# Patient Record
Sex: FEMALE | Race: OTHER | Employment: UNEMPLOYED | URBAN - METROPOLITAN AREA
[De-identification: names, ages, dates, MRNs, and addresses within clinical notes are randomized per-mention and may not be internally consistent; named-entity substitution may affect disease eponyms.]

---

## 2017-10-13 ENCOUNTER — HOSPITAL ENCOUNTER (OUTPATIENT)
Dept: PREADMISSION TESTING | Age: 47
Discharge: HOME | End: 2017-10-13
Payer: COMMERCIAL

## 2017-10-13 ENCOUNTER — HOSPITAL ENCOUNTER (OUTPATIENT)
Dept: GENERAL RADIOLOGY | Age: 47
Discharge: HOME | End: 2017-10-13
Payer: COMMERCIAL

## 2017-10-13 VITALS
WEIGHT: 168 LBS | SYSTOLIC BLOOD PRESSURE: 127 MMHG | HEIGHT: 62 IN | BODY MASS INDEX: 30.91 KG/M2 | TEMPERATURE: 98.3 F | RESPIRATION RATE: 18 BRPM | DIASTOLIC BLOOD PRESSURE: 82 MMHG | HEART RATE: 76 BPM

## 2017-10-13 DIAGNOSIS — Z01.818 PREOP EXAMINATION: ICD-10-CM

## 2017-10-13 LAB
ALBUMIN SERPL-MCNC: 3.7 G/DL (ref 3.5–5)
ALBUMIN/GLOB SERPL: 1 {RATIO} (ref 1.1–2.2)
ALP SERPL-CCNC: 167 U/L (ref 45–117)
ALT SERPL-CCNC: 46 U/L (ref 12–78)
ANION GAP SERPL CALC-SCNC: 8 MMOL/L (ref 5–15)
AST SERPL-CCNC: 17 U/L (ref 15–37)
BASOPHILS # BLD: 0 K/UL (ref 0–0.1)
BASOPHILS NFR BLD: 0 % (ref 0–1)
BILIRUB SERPL-MCNC: 0.2 MG/DL (ref 0.2–1)
BUN SERPL-MCNC: 11 MG/DL (ref 6–20)
BUN/CREAT SERPL: 15 (ref 12–20)
CALCIUM SERPL-MCNC: 9.3 MG/DL (ref 8.5–10.1)
CHLORIDE SERPL-SCNC: 103 MMOL/L (ref 97–108)
CO2 SERPL-SCNC: 29 MMOL/L (ref 21–32)
CREAT SERPL-MCNC: 0.72 MG/DL (ref 0.55–1.02)
DIFFERENTIAL METHOD BLD: ABNORMAL
EOSINOPHIL # BLD: 0.2 K/UL (ref 0–0.4)
EOSINOPHIL NFR BLD: 2 % (ref 0–7)
ERYTHROCYTE [DISTWIDTH] IN BLOOD BY AUTOMATED COUNT: 22.4 % (ref 11.5–14.5)
GLOBULIN SER CALC-MCNC: 3.7 G/DL (ref 2–4)
GLUCOSE SERPL-MCNC: 92 MG/DL (ref 65–100)
HCT VFR BLD AUTO: 42.4 % (ref 35–47)
HGB BLD-MCNC: 13.7 G/DL (ref 11.5–16)
LYMPHOCYTES # BLD: 2.3 K/UL (ref 0.8–3.5)
LYMPHOCYTES NFR BLD: 25 % (ref 12–49)
MCH RBC QN AUTO: 25.1 PG (ref 26–34)
MCHC RBC AUTO-ENTMCNC: 32.3 G/DL (ref 30–36.5)
MCV RBC AUTO: 77.7 FL (ref 80–99)
MONOCYTES # BLD: 0.5 K/UL (ref 0–1)
MONOCYTES NFR BLD: 5 % (ref 5–13)
NEUTS SEG # BLD: 6.1 K/UL (ref 1.8–8)
NEUTS SEG NFR BLD: 68 % (ref 32–75)
PLATELET # BLD AUTO: 398 K/UL (ref 150–400)
POTASSIUM SERPL-SCNC: 3.9 MMOL/L (ref 3.5–5.1)
PROT SERPL-MCNC: 7.4 G/DL (ref 6.4–8.2)
RBC # BLD AUTO: 5.46 M/UL (ref 3.8–5.2)
RBC MORPH BLD: ABNORMAL
SODIUM SERPL-SCNC: 140 MMOL/L (ref 136–145)
WBC # BLD AUTO: 9.1 K/UL (ref 3.6–11)
WBC MORPH BLD: ABNORMAL

## 2017-10-13 PROCEDURE — 71020 XR CHEST PA LAT: CPT

## 2017-10-13 PROCEDURE — 80053 COMPREHEN METABOLIC PANEL: CPT | Performed by: SPECIALIST

## 2017-10-13 PROCEDURE — 36415 COLL VENOUS BLD VENIPUNCTURE: CPT | Performed by: SPECIALIST

## 2017-10-13 PROCEDURE — 93005 ELECTROCARDIOGRAM TRACING: CPT

## 2017-10-13 PROCEDURE — 85025 COMPLETE CBC W/AUTO DIFF WBC: CPT | Performed by: SPECIALIST

## 2017-10-13 PROCEDURE — 86900 BLOOD TYPING SEROLOGIC ABO: CPT | Performed by: SPECIALIST

## 2017-10-13 NOTE — PERIOP NOTES
Spoke with Dr. Lauren Henning, and informed her pt did not understand surgery when consent reviewed during PAT appt. And that pt was instructed to call Dr. Maryann Covarrubias to clarify surgery.

## 2017-10-13 NOTE — PERIOP NOTES
Patient given surgical site infection FAQs handout and hand hygiene tips sheet. Pre-operative instructions reviewed and patient verbalizes understanding of instructions. Patient has been given the opportunity to ask additional questions. Patient given six packs of CHG wipes. Instructions reviewed on use of CHG wipes. PATIENT GIVEN WRITTEN INSTRUCTIONS TO GO TO THE [facility]AGING CENTER[facility] [address] [place] [address] [address] TO HAVE CHEST XRAY COMPLETED.

## 2017-10-14 LAB
ATRIAL RATE: 72 BPM
CALCULATED P AXIS, ECG09: 64 DEGREES
CALCULATED R AXIS, ECG10: 28 DEGREES
CALCULATED T AXIS, ECG11: 32 DEGREES
DIAGNOSIS, 93000: NORMAL
P-R INTERVAL, ECG05: 156 MS
Q-T INTERVAL, ECG07: 408 MS
QRS DURATION, ECG06: 84 MS
QTC CALCULATION (BEZET), ECG08: 446 MS
VENTRICULAR RATE, ECG03: 72 BPM

## 2017-10-16 LAB
ABO + RH BLD: NORMAL
BLOOD GROUP ANTIBODIES SERPL: NORMAL
SPECIMEN EXP DATE BLD: NORMAL

## 2017-10-17 ENCOUNTER — ANESTHESIA (OUTPATIENT)
Dept: SURGERY | Age: 47
End: 2017-10-17
Payer: COMMERCIAL

## 2017-10-17 ENCOUNTER — HOSPITAL ENCOUNTER (OUTPATIENT)
Age: 47
Setting detail: OBSERVATION
Discharge: HOME | End: 2017-10-18
Attending: SPECIALIST | Admitting: SPECIALIST
Payer: COMMERCIAL

## 2017-10-17 ENCOUNTER — ANESTHESIA EVENT (OUTPATIENT)
Dept: SURGERY | Age: 47
End: 2017-10-17
Payer: COMMERCIAL

## 2017-10-17 PROBLEM — Z90.710 S/P HYSTERECTOMY: Status: ACTIVE | Noted: 2017-10-17

## 2017-10-17 LAB
GLUCOSE BLD STRIP.AUTO-MCNC: 122 MG/DL (ref 65–100)
GLUCOSE BLD STRIP.AUTO-MCNC: 164 MG/DL (ref 65–100)
GLUCOSE BLD STRIP.AUTO-MCNC: 166 MG/DL (ref 65–100)
GLUCOSE BLD STRIP.AUTO-MCNC: 235 MG/DL (ref 65–100)
HCG UR QL: NEGATIVE
SERVICE CMNT-IMP: ABNORMAL

## 2017-10-17 PROCEDURE — 74011000250 HC RX REV CODE- 250: Performed by: SPECIALIST

## 2017-10-17 PROCEDURE — 77030014646 HC SEAL FBRN TISSL 2ML W/APPL BAXT -C: Performed by: SPECIALIST

## 2017-10-17 PROCEDURE — 74011250636 HC RX REV CODE- 250/636: Performed by: SPECIALIST

## 2017-10-17 PROCEDURE — 77030020782 HC GWN BAIR PAWS FLX 3M -B

## 2017-10-17 PROCEDURE — 76060000034 HC ANESTHESIA 1.5 TO 2 HR: Performed by: SPECIALIST

## 2017-10-17 PROCEDURE — 74011250636 HC RX REV CODE- 250/636: Performed by: ANESTHESIOLOGY

## 2017-10-17 PROCEDURE — 77030033067 HC SUT PDO STRATFX SPIR J&J -B: Performed by: SPECIALIST

## 2017-10-17 PROCEDURE — 77030018778 HC MANIP UTER VCAR CNMD -B: Performed by: SPECIALIST

## 2017-10-17 PROCEDURE — 77030034133 HC APPL ENDOSCP FLX SPRY BAXT -C: Performed by: SPECIALIST

## 2017-10-17 PROCEDURE — 77030032060 HC PWDR HEMSTAT ARISTA ASRB 3GM BARD -C: Performed by: SPECIALIST

## 2017-10-17 PROCEDURE — 77030035045 HC TRCR ENDOSC VRSPRT BLDLSS COVD -B: Performed by: SPECIALIST

## 2017-10-17 PROCEDURE — 77030007955 HC PCH ENDOSC SPEC J&J -B: Performed by: SPECIALIST

## 2017-10-17 PROCEDURE — 77030020703 HC SEAL CANN DISP INTU -B: Performed by: SPECIALIST

## 2017-10-17 PROCEDURE — 77030027743 HC APPL F/HEMSTAT BARD -B: Performed by: SPECIALIST

## 2017-10-17 PROCEDURE — 77030035277 HC OBTRTR BLDELSS DISP INTU -B: Performed by: SPECIALIST

## 2017-10-17 PROCEDURE — 81025 URINE PREGNANCY TEST: CPT

## 2017-10-17 PROCEDURE — 77030005518 HC CATH URETH FOL 2W BARD -B: Performed by: SPECIALIST

## 2017-10-17 PROCEDURE — 74011000250 HC RX REV CODE- 250

## 2017-10-17 PROCEDURE — 76010000875 HC OR TIME 1.5 TO 2HR INTENSV - TIER 2: Performed by: SPECIALIST

## 2017-10-17 PROCEDURE — 77030027138 HC INCENT SPIROMETER -A

## 2017-10-17 PROCEDURE — 77030016151 HC PROTCTR LNS DFOG COVD -B: Performed by: SPECIALIST

## 2017-10-17 PROCEDURE — 82962 GLUCOSE BLOOD TEST: CPT

## 2017-10-17 PROCEDURE — 77030020263 HC SOL INJ SOD CL0.9% LFCR 1000ML: Performed by: SPECIALIST

## 2017-10-17 PROCEDURE — 77030031139 HC SUT VCRL2 J&J -A: Performed by: SPECIALIST

## 2017-10-17 PROCEDURE — 77030008557 HC TBNG SMK EVAC STOR -B: Performed by: SPECIALIST

## 2017-10-17 PROCEDURE — 77030008684 HC TU ET CUF COVD -B: Performed by: ANESTHESIOLOGY

## 2017-10-17 PROCEDURE — 77030010507 HC ADH SKN DERMBND J&J -B: Performed by: SPECIALIST

## 2017-10-17 PROCEDURE — 77030002895 HC DEV VASC CLOSR COVD -B: Performed by: SPECIALIST

## 2017-10-17 PROCEDURE — 77030035051: Performed by: SPECIALIST

## 2017-10-17 PROCEDURE — 74011250636 HC RX REV CODE- 250/636

## 2017-10-17 PROCEDURE — 77030018836 HC SOL IRR NACL ICUM -A: Performed by: SPECIALIST

## 2017-10-17 PROCEDURE — 77030002933 HC SUT MCRYL J&J -A: Performed by: SPECIALIST

## 2017-10-17 PROCEDURE — 76210000016 HC OR PH I REC 1 TO 1.5 HR: Performed by: SPECIALIST

## 2017-10-17 PROCEDURE — 77030011640 HC PAD GRND REM COVD -A: Performed by: SPECIALIST

## 2017-10-17 PROCEDURE — 74011250637 HC RX REV CODE- 250/637: Performed by: ANESTHESIOLOGY

## 2017-10-17 PROCEDURE — 88307 TISSUE EXAM BY PATHOLOGIST: CPT | Performed by: SPECIALIST

## 2017-10-17 PROCEDURE — C1765 ADHESION BARRIER: HCPCS | Performed by: SPECIALIST

## 2017-10-17 PROCEDURE — 99218 HC RM OBSERVATION: CPT

## 2017-10-17 PROCEDURE — 77030035048 HC TRCR ENDOSC OPTCL COVD -B: Performed by: SPECIALIST

## 2017-10-17 PROCEDURE — 77030026438 HC STYL ET INTUB CARD -A: Performed by: ANESTHESIOLOGY

## 2017-10-17 PROCEDURE — 74011636637 HC RX REV CODE- 636/637: Performed by: SPECIALIST

## 2017-10-17 PROCEDURE — 77030003580 HC NDL INSUF VERES J&J -B: Performed by: SPECIALIST

## 2017-10-17 PROCEDURE — 77030008756 HC TU IRR SUC STRY -B: Performed by: SPECIALIST

## 2017-10-17 RX ORDER — MIDAZOLAM HYDROCHLORIDE 1 MG/ML
1 INJECTION, SOLUTION INTRAMUSCULAR; INTRAVENOUS AS NEEDED
Status: DISCONTINUED | OUTPATIENT
Start: 2017-10-17 | End: 2017-10-17 | Stop reason: HOSPADM

## 2017-10-17 RX ORDER — METRONIDAZOLE 500 MG/100ML
500 INJECTION, SOLUTION INTRAVENOUS ONCE
Status: DISCONTINUED | OUTPATIENT
Start: 2017-10-17 | End: 2017-10-17

## 2017-10-17 RX ORDER — SODIUM CHLORIDE, SODIUM LACTATE, POTASSIUM CHLORIDE, CALCIUM CHLORIDE 600; 310; 30; 20 MG/100ML; MG/100ML; MG/100ML; MG/100ML
150 INJECTION, SOLUTION INTRAVENOUS CONTINUOUS
Status: DISCONTINUED | OUTPATIENT
Start: 2017-10-17 | End: 2017-10-18 | Stop reason: HOSPADM

## 2017-10-17 RX ORDER — MORPHINE SULFATE 10 MG/ML
2 INJECTION, SOLUTION INTRAMUSCULAR; INTRAVENOUS
Status: DISCONTINUED | OUTPATIENT
Start: 2017-10-17 | End: 2017-10-17 | Stop reason: HOSPADM

## 2017-10-17 RX ORDER — DIPHENHYDRAMINE HYDROCHLORIDE 50 MG/ML
12.5 INJECTION, SOLUTION INTRAMUSCULAR; INTRAVENOUS AS NEEDED
Status: DISCONTINUED | OUTPATIENT
Start: 2017-10-17 | End: 2017-10-17 | Stop reason: HOSPADM

## 2017-10-17 RX ORDER — SODIUM CHLORIDE, SODIUM LACTATE, POTASSIUM CHLORIDE, CALCIUM CHLORIDE 600; 310; 30; 20 MG/100ML; MG/100ML; MG/100ML; MG/100ML
125 INJECTION, SOLUTION INTRAVENOUS CONTINUOUS
Status: DISCONTINUED | OUTPATIENT
Start: 2017-10-17 | End: 2017-10-17 | Stop reason: HOSPADM

## 2017-10-17 RX ORDER — CEFAZOLIN SODIUM IN 0.9 % NACL 2 G/50 ML
2 INTRAVENOUS SOLUTION, PIGGYBACK (ML) INTRAVENOUS ONCE
Status: COMPLETED | OUTPATIENT
Start: 2017-10-17 | End: 2017-10-17

## 2017-10-17 RX ORDER — ONDANSETRON 2 MG/ML
4 INJECTION INTRAMUSCULAR; INTRAVENOUS AS NEEDED
Status: DISCONTINUED | OUTPATIENT
Start: 2017-10-17 | End: 2017-10-17 | Stop reason: HOSPADM

## 2017-10-17 RX ORDER — PROPOFOL 10 MG/ML
INJECTION, EMULSION INTRAVENOUS AS NEEDED
Status: DISCONTINUED | OUTPATIENT
Start: 2017-10-17 | End: 2017-10-17 | Stop reason: HOSPADM

## 2017-10-17 RX ORDER — GLYCOPYRROLATE 0.2 MG/ML
INJECTION INTRAMUSCULAR; INTRAVENOUS AS NEEDED
Status: DISCONTINUED | OUTPATIENT
Start: 2017-10-17 | End: 2017-10-17 | Stop reason: HOSPADM

## 2017-10-17 RX ORDER — MIDAZOLAM HYDROCHLORIDE 1 MG/ML
0.5 INJECTION, SOLUTION INTRAMUSCULAR; INTRAVENOUS
Status: DISCONTINUED | OUTPATIENT
Start: 2017-10-17 | End: 2017-10-17 | Stop reason: HOSPADM

## 2017-10-17 RX ORDER — HYDROMORPHONE HYDROCHLORIDE 1 MG/ML
INJECTION, SOLUTION INTRAMUSCULAR; INTRAVENOUS; SUBCUTANEOUS AS NEEDED
Status: DISCONTINUED | OUTPATIENT
Start: 2017-10-17 | End: 2017-10-17 | Stop reason: HOSPADM

## 2017-10-17 RX ORDER — METRONIDAZOLE 500 MG/100ML
500 INJECTION, SOLUTION INTRAVENOUS ONCE
Status: COMPLETED | OUTPATIENT
Start: 2017-10-17 | End: 2017-10-17

## 2017-10-17 RX ORDER — ACETAMINOPHEN 325 MG/1
650 TABLET ORAL ONCE
Status: COMPLETED | OUTPATIENT
Start: 2017-10-17 | End: 2017-10-17

## 2017-10-17 RX ORDER — SODIUM CHLORIDE, SODIUM LACTATE, POTASSIUM CHLORIDE, CALCIUM CHLORIDE 600; 310; 30; 20 MG/100ML; MG/100ML; MG/100ML; MG/100ML
INJECTION, SOLUTION INTRAVENOUS
Status: DISCONTINUED | OUTPATIENT
Start: 2017-10-17 | End: 2017-10-17 | Stop reason: HOSPADM

## 2017-10-17 RX ORDER — OXYCODONE AND ACETAMINOPHEN 10; 325 MG/1; MG/1
1-2 TABLET ORAL
Status: DISCONTINUED | OUTPATIENT
Start: 2017-10-17 | End: 2017-10-18 | Stop reason: HOSPADM

## 2017-10-17 RX ORDER — INSULIN LISPRO 100 [IU]/ML
INJECTION, SOLUTION INTRAVENOUS; SUBCUTANEOUS
Status: DISCONTINUED | OUTPATIENT
Start: 2017-10-17 | End: 2017-10-18 | Stop reason: HOSPADM

## 2017-10-17 RX ORDER — DEXTROSE 50 % IN WATER (D50W) INTRAVENOUS SYRINGE
12.5-25 AS NEEDED
Status: DISCONTINUED | OUTPATIENT
Start: 2017-10-17 | End: 2017-10-18 | Stop reason: HOSPADM

## 2017-10-17 RX ORDER — FENTANYL CITRATE 50 UG/ML
INJECTION, SOLUTION INTRAMUSCULAR; INTRAVENOUS AS NEEDED
Status: DISCONTINUED | OUTPATIENT
Start: 2017-10-17 | End: 2017-10-17 | Stop reason: HOSPADM

## 2017-10-17 RX ORDER — ROCURONIUM BROMIDE 10 MG/ML
INJECTION, SOLUTION INTRAVENOUS AS NEEDED
Status: DISCONTINUED | OUTPATIENT
Start: 2017-10-17 | End: 2017-10-17 | Stop reason: HOSPADM

## 2017-10-17 RX ORDER — SODIUM CHLORIDE 0.9 % (FLUSH) 0.9 %
SYRINGE (ML) INJECTION
Status: DISPENSED
Start: 2017-10-17 | End: 2017-10-18

## 2017-10-17 RX ORDER — LIDOCAINE HYDROCHLORIDE 20 MG/ML
INJECTION, SOLUTION EPIDURAL; INFILTRATION; INTRACAUDAL; PERINEURAL AS NEEDED
Status: DISCONTINUED | OUTPATIENT
Start: 2017-10-17 | End: 2017-10-17 | Stop reason: HOSPADM

## 2017-10-17 RX ORDER — SODIUM CHLORIDE, SODIUM LACTATE, POTASSIUM CHLORIDE, CALCIUM CHLORIDE 600; 310; 30; 20 MG/100ML; MG/100ML; MG/100ML; MG/100ML
25 INJECTION, SOLUTION INTRAVENOUS CONTINUOUS
Status: DISCONTINUED | OUTPATIENT
Start: 2017-10-17 | End: 2017-10-17 | Stop reason: HOSPADM

## 2017-10-17 RX ORDER — ENOXAPARIN SODIUM 100 MG/ML
40 INJECTION SUBCUTANEOUS ONCE
Status: COMPLETED | OUTPATIENT
Start: 2017-10-17 | End: 2017-10-17

## 2017-10-17 RX ORDER — OXYCODONE HYDROCHLORIDE 5 MG/1
5 TABLET ORAL AS NEEDED
Status: DISCONTINUED | OUTPATIENT
Start: 2017-10-17 | End: 2017-10-17 | Stop reason: HOSPADM

## 2017-10-17 RX ORDER — BUPIVACAINE HYDROCHLORIDE 5 MG/ML
30 INJECTION, SOLUTION EPIDURAL; INTRACAUDAL ONCE
Status: COMPLETED | OUTPATIENT
Start: 2017-10-17 | End: 2017-10-17

## 2017-10-17 RX ORDER — HYDROMORPHONE HYDROCHLORIDE 1 MG/ML
0.2 INJECTION, SOLUTION INTRAMUSCULAR; INTRAVENOUS; SUBCUTANEOUS
Status: DISCONTINUED | OUTPATIENT
Start: 2017-10-17 | End: 2017-10-17 | Stop reason: HOSPADM

## 2017-10-17 RX ORDER — HYDROMORPHONE HYDROCHLORIDE 1 MG/ML
1 INJECTION, SOLUTION INTRAMUSCULAR; INTRAVENOUS; SUBCUTANEOUS
Status: DISCONTINUED | OUTPATIENT
Start: 2017-10-17 | End: 2017-10-18 | Stop reason: HOSPADM

## 2017-10-17 RX ORDER — SUCCINYLCHOLINE CHLORIDE 20 MG/ML
INJECTION INTRAMUSCULAR; INTRAVENOUS AS NEEDED
Status: DISCONTINUED | OUTPATIENT
Start: 2017-10-17 | End: 2017-10-17 | Stop reason: HOSPADM

## 2017-10-17 RX ORDER — DEXAMETHASONE SODIUM PHOSPHATE 4 MG/ML
INJECTION, SOLUTION INTRA-ARTICULAR; INTRALESIONAL; INTRAMUSCULAR; INTRAVENOUS; SOFT TISSUE AS NEEDED
Status: DISCONTINUED | OUTPATIENT
Start: 2017-10-17 | End: 2017-10-17 | Stop reason: HOSPADM

## 2017-10-17 RX ORDER — MAGNESIUM SULFATE 100 %
4 CRYSTALS MISCELLANEOUS AS NEEDED
Status: DISCONTINUED | OUTPATIENT
Start: 2017-10-17 | End: 2017-10-18 | Stop reason: HOSPADM

## 2017-10-17 RX ORDER — SODIUM CHLORIDE 0.9 % (FLUSH) 0.9 %
5-10 SYRINGE (ML) INJECTION EVERY 8 HOURS
Status: DISCONTINUED | OUTPATIENT
Start: 2017-10-17 | End: 2017-10-17 | Stop reason: HOSPADM

## 2017-10-17 RX ORDER — SODIUM CHLORIDE 0.9 % (FLUSH) 0.9 %
5-10 SYRINGE (ML) INJECTION AS NEEDED
Status: DISCONTINUED | OUTPATIENT
Start: 2017-10-17 | End: 2017-10-17 | Stop reason: HOSPADM

## 2017-10-17 RX ORDER — ONDANSETRON 2 MG/ML
4 INJECTION INTRAMUSCULAR; INTRAVENOUS
Status: DISCONTINUED | OUTPATIENT
Start: 2017-10-17 | End: 2017-10-18 | Stop reason: HOSPADM

## 2017-10-17 RX ORDER — FENTANYL CITRATE 50 UG/ML
25 INJECTION, SOLUTION INTRAMUSCULAR; INTRAVENOUS
Status: DISCONTINUED | OUTPATIENT
Start: 2017-10-17 | End: 2017-10-17 | Stop reason: HOSPADM

## 2017-10-17 RX ORDER — MIDAZOLAM HYDROCHLORIDE 1 MG/ML
INJECTION, SOLUTION INTRAMUSCULAR; INTRAVENOUS AS NEEDED
Status: DISCONTINUED | OUTPATIENT
Start: 2017-10-17 | End: 2017-10-17 | Stop reason: HOSPADM

## 2017-10-17 RX ORDER — ONDANSETRON 2 MG/ML
INJECTION INTRAMUSCULAR; INTRAVENOUS AS NEEDED
Status: DISCONTINUED | OUTPATIENT
Start: 2017-10-17 | End: 2017-10-17 | Stop reason: HOSPADM

## 2017-10-17 RX ORDER — NEOSTIGMINE METHYLSULFATE 1 MG/ML
INJECTION INTRAVENOUS AS NEEDED
Status: DISCONTINUED | OUTPATIENT
Start: 2017-10-17 | End: 2017-10-17 | Stop reason: HOSPADM

## 2017-10-17 RX ORDER — LIDOCAINE HYDROCHLORIDE 10 MG/ML
0.1 INJECTION, SOLUTION EPIDURAL; INFILTRATION; INTRACAUDAL; PERINEURAL AS NEEDED
Status: DISCONTINUED | OUTPATIENT
Start: 2017-10-17 | End: 2017-10-17 | Stop reason: HOSPADM

## 2017-10-17 RX ORDER — KETOROLAC TROMETHAMINE 30 MG/ML
INJECTION, SOLUTION INTRAMUSCULAR; INTRAVENOUS AS NEEDED
Status: DISCONTINUED | OUTPATIENT
Start: 2017-10-17 | End: 2017-10-17 | Stop reason: HOSPADM

## 2017-10-17 RX ORDER — FENTANYL CITRATE 50 UG/ML
50 INJECTION, SOLUTION INTRAMUSCULAR; INTRAVENOUS AS NEEDED
Status: DISCONTINUED | OUTPATIENT
Start: 2017-10-17 | End: 2017-10-17 | Stop reason: HOSPADM

## 2017-10-17 RX ORDER — SODIUM CHLORIDE 9 MG/ML
25 INJECTION, SOLUTION INTRAVENOUS CONTINUOUS
Status: DISCONTINUED | OUTPATIENT
Start: 2017-10-17 | End: 2017-10-17 | Stop reason: HOSPADM

## 2017-10-17 RX ADMIN — HYDROMORPHONE HYDROCHLORIDE 1 MG: 1 INJECTION, SOLUTION INTRAMUSCULAR; INTRAVENOUS; SUBCUTANEOUS at 11:30

## 2017-10-17 RX ADMIN — CEFAZOLIN 2 G: 1 INJECTION, POWDER, FOR SOLUTION INTRAMUSCULAR; INTRAVENOUS; PARENTERAL at 10:40

## 2017-10-17 RX ADMIN — SODIUM CHLORIDE, SODIUM LACTATE, POTASSIUM CHLORIDE, CALCIUM CHLORIDE: 600; 310; 30; 20 INJECTION, SOLUTION INTRAVENOUS at 10:29

## 2017-10-17 RX ADMIN — INSULIN LISPRO 2 UNITS: 100 INJECTION, SOLUTION INTRAVENOUS; SUBCUTANEOUS at 17:10

## 2017-10-17 RX ADMIN — LIDOCAINE HYDROCHLORIDE 80 MG: 20 INJECTION, SOLUTION EPIDURAL; INFILTRATION; INTRACAUDAL; PERINEURAL at 10:35

## 2017-10-17 RX ADMIN — ROCURONIUM BROMIDE 5 MG: 10 INJECTION, SOLUTION INTRAVENOUS at 10:35

## 2017-10-17 RX ADMIN — METRONIDAZOLE 500 MG: 500 INJECTION, SOLUTION INTRAVENOUS at 10:28

## 2017-10-17 RX ADMIN — ACETAMINOPHEN 650 MG: 325 TABLET, FILM COATED ORAL at 10:10

## 2017-10-17 RX ADMIN — FENTANYL CITRATE 150 MCG: 50 INJECTION, SOLUTION INTRAMUSCULAR; INTRAVENOUS at 10:35

## 2017-10-17 RX ADMIN — SUCCINYLCHOLINE CHLORIDE 120 MG: 20 INJECTION INTRAMUSCULAR; INTRAVENOUS at 10:35

## 2017-10-17 RX ADMIN — INSULIN LISPRO 2 UNITS: 100 INJECTION, SOLUTION INTRAVENOUS; SUBCUTANEOUS at 22:36

## 2017-10-17 RX ADMIN — SODIUM CHLORIDE, SODIUM LACTATE, POTASSIUM CHLORIDE, AND CALCIUM CHLORIDE 25 ML/HR: 600; 310; 30; 20 INJECTION, SOLUTION INTRAVENOUS at 10:10

## 2017-10-17 RX ADMIN — ROCURONIUM BROMIDE 10 MG: 10 INJECTION, SOLUTION INTRAVENOUS at 11:00

## 2017-10-17 RX ADMIN — GLYCOPYRROLATE 0.4 MG: 0.2 INJECTION INTRAMUSCULAR; INTRAVENOUS at 11:38

## 2017-10-17 RX ADMIN — FENTANYL CITRATE 25 MCG: 50 INJECTION, SOLUTION INTRAMUSCULAR; INTRAVENOUS at 12:55

## 2017-10-17 RX ADMIN — ROCURONIUM BROMIDE 25 MG: 10 INJECTION, SOLUTION INTRAVENOUS at 10:45

## 2017-10-17 RX ADMIN — HYDROMORPHONE HYDROCHLORIDE 1 MG: 1 INJECTION, SOLUTION INTRAMUSCULAR; INTRAVENOUS; SUBCUTANEOUS at 22:55

## 2017-10-17 RX ADMIN — FENTANYL CITRATE 50 MCG: 50 INJECTION, SOLUTION INTRAMUSCULAR; INTRAVENOUS at 11:00

## 2017-10-17 RX ADMIN — SODIUM CHLORIDE, SODIUM LACTATE, POTASSIUM CHLORIDE, AND CALCIUM CHLORIDE 125 ML/HR: 600; 310; 30; 20 INJECTION, SOLUTION INTRAVENOUS at 12:23

## 2017-10-17 RX ADMIN — MIDAZOLAM HYDROCHLORIDE 2 MG: 1 INJECTION, SOLUTION INTRAMUSCULAR; INTRAVENOUS at 10:29

## 2017-10-17 RX ADMIN — HYDROMORPHONE HYDROCHLORIDE 1 MG: 1 INJECTION, SOLUTION INTRAMUSCULAR; INTRAVENOUS; SUBCUTANEOUS at 16:37

## 2017-10-17 RX ADMIN — KETOROLAC TROMETHAMINE 30 MG: 30 INJECTION, SOLUTION INTRAMUSCULAR; INTRAVENOUS at 11:33

## 2017-10-17 RX ADMIN — FENTANYL CITRATE 25 MCG: 50 INJECTION, SOLUTION INTRAMUSCULAR; INTRAVENOUS at 13:04

## 2017-10-17 RX ADMIN — ENOXAPARIN SODIUM 40 MG: 40 INJECTION SUBCUTANEOUS at 10:10

## 2017-10-17 RX ADMIN — ONDANSETRON 4 MG: 2 INJECTION INTRAMUSCULAR; INTRAVENOUS at 10:45

## 2017-10-17 RX ADMIN — PROPOFOL 150 MG: 10 INJECTION, EMULSION INTRAVENOUS at 10:35

## 2017-10-17 RX ADMIN — SODIUM CHLORIDE, SODIUM LACTATE, POTASSIUM CHLORIDE, AND CALCIUM CHLORIDE 150 ML/HR: 600; 310; 30; 20 INJECTION, SOLUTION INTRAVENOUS at 14:54

## 2017-10-17 RX ADMIN — DEXAMETHASONE SODIUM PHOSPHATE 4 MG: 4 INJECTION, SOLUTION INTRA-ARTICULAR; INTRALESIONAL; INTRAMUSCULAR; INTRAVENOUS; SOFT TISSUE at 10:45

## 2017-10-17 RX ADMIN — NEOSTIGMINE METHYLSULFATE 3 MG: 1 INJECTION INTRAVENOUS at 11:38

## 2017-10-17 NOTE — PERIOP NOTES
TRANSFER - OUT REPORT:    Verbal report given to ida rn(name) on [place]  being transferred to [facility](unit) for routine progression of care       Report consisted of patients Situation, Background, Assessment and   Recommendations(SBAR). Time Pre op antibiotic given:1040  Anesthesia Stop time: 7741  Greer Present on Transfer to floor:yes  Order for Greer on Chart:yes    Information from the following report(s) Intake/Output and MAR was reviewed with the receiving nurse. Opportunity for questions and clarification was provided. Is the patient on 02? YES       L/Min 2       Other no    Is the patient on a monitor? NO    Is the nurse transporting with the patient? NO    Surgical Waiting Area notified of patient's transfer from PACU?  YES      The following personal items collected during your admission accompanied patient upon transfer:   Dental Appliance: Dental Appliances: None  Vision:    Hearing Aid:    Jewelry:    Clothing: Clothing:  (clothes to [employer])  Other Valuables:    Valuables sent to safe:

## 2017-10-17 NOTE — ANESTHESIA POSTPROCEDURE EVALUATION
Post-Anesthesia Evaluation and Assessment    Patient: Osiris Brasher MRN: [id number]  SSN: [id number]    YOB: 1970  Age: 56 y.o. Sex: female       Cardiovascular Function/Vital Signs  Visit Vitals    /85    Pulse 67    Temp 36.7 °C (98 °F)    Resp 13    Ht 5' 1.5\" (1.562 m)    Wt 76.2 kg (168 lb)    SpO2 96%    BMI 31.23 kg/m2       Patient is status post general anesthesia for Procedure(s):  ROBOTIC ASSISTED TOTAL HYSTERECTOMY, BILATERAL SALPINGECTOMIES. Nausea/Vomiting: None    Postoperative hydration reviewed and adequate. Pain:  Pain Scale 1: Visual (10/17/17 1310)  Pain Intensity 1: 0 (10/17/17 1310)   Managed    Neurological Status:   Neuro (WDL): Exceptions to WDL (10/17/17 1212)  Neuro  Neurologic State: Appropriate for age (10/17/17 1302)  Orientation Level: Appropriate for age (10/17/17 1302)  Cognition: Follows commands (10/17/17 1302)  Speech: Clear (10/17/17 1302)  LUE Motor Response: Purposeful (10/17/17 1302)  LLE Motor Response: Purposeful (10/17/17 1302)  RUE Motor Response: Purposeful (10/17/17 1302)  RLE Motor Response: Purposeful (10/17/17 1302)   At baseline    Mental Status and Level of Consciousness: Arousable    Pulmonary Status:   O2 Device: Nasal cannula (10/17/17 1302)   Adequate oxygenation and airway patent    Complications related to anesthesia: None    Post-anesthesia assessment completed.  No concerns    Signed By: Faye Bryan MD     October 17, 2017

## 2017-10-17 NOTE — IP AVS SNAPSHOT
[address] 
[phone] Patient: Richa Aguirre MRN: [id number] ZYE:9[birth year] You are allergic to the following No active allergies Recent Documentation Height Weight BMI OB Status Smoking Status 1.562 m 76.2 kg 31.23 kg/m2 Having regular periods Never Smoker Emergency Contacts Name Discharge Info Relation Home Work Mobile Marie Mathur DISCHARGE CAREGIVER [3] Other Relative [6] [phone] About your hospitalization You were admitted on:  October 17, 2017 You last received care in the:  [facility] [place] [facility]  You were discharged on:  October 18, 2017 Unit phone number:  [phone] Why you were hospitalized Your primary diagnosis was:  Not on File Your diagnoses also included:  S/P Hysterectomy Providers Seen During Your Hospitalizations Provider Role Specialty Primary office phone Asher Garay MD Attending Provider Obstetrics & Gynecology [phone] Lisa Narvaez MD Attending Provider Obstetrics & Gynecology [phone] Your Primary Care Physician (PCP) Primary Care Physician Office Phone Office Fax Casie DUGGAN [phone] Follow-up Information Follow up With Details Comments Contact Info Asher Garay MD   [address] [facility]. [phone] Current Discharge Medication List  
  
START taking these medications Dose & Instructions Dispensing Information Comments Morning Noon Evening Bedtime  
 oxyCODONE-acetaminophen  mg per tablet Commonly known as:  PERCOCET 10 Your last dose was: Your next dose is:    
   
   
 Dose:  1-2 Tab Take 1-2 Tabs by mouth every four (4) hours as needed (pain). Max Daily Amount: 12 Tabs. Quantity:  30 Tab Refills:  0 ASK your doctor about these medications Dose & Instructions Dispensing Information Comments Morning Noon Evening Bedtime  
 atorvastatin 10 mg tablet Commonly known as:  LIPITOR Your last dose was: Your next dose is:    
   
   
 Dose:  10 mg Take 1 Tab by mouth daily. Quantity:  30 Tab Refills:  5  
     
   
   
   
  
 esomeprazole 40 mg capsule Commonly known as:  Lafclarisa Beck Your last dose was: Your next dose is: Take  by mouth as needed. Refills:  0  
     
   
   
   
  
 metFORMIN 1,000 mg tablet Commonly known as:  GLUCOPHAGE Your last dose was: Your next dose is:    
   
   
 Dose:  1000 mg Take 1 Tab by mouth two (2) times daily (with meals). Quantity:  60 Tab Refills:  5  
     
   
   
   
  
 TRESIBA FLEXTOUCH U-100 SC Your last dose was: Your next dose is:    
   
   
 Dose:  64 Units 64 Units by SubCUTAneous route. Every other night. Refills:  0 Where to Get Your Medications Information on where to get these meds will be given to you by the nurse or doctor. ! Ask your nurse or doctor about these medications  
  oxyCODONE-acetaminophen  mg per tablet Discharge Instructions Histerectomía laparoscópica: En Vo en el hogar - [ Laparoscopic Hysterectomy: What to Expect at [facility] ] Murphy recuperación [place] es nannette cirugía para extraer el Krista Lakia. En algunos casos, también se extraen los ovarios y las trompas de Falopio al [employer]. Usted puede esperar sentirse mejor y más karo cada día, aunque es posible que necesite analgésicos (medicamentos para el dolor) por nannette o Abbe. Es posible que se canse con facilidad o que tenga menos energía de lo habitual. El cansancio podría durar varias semanas después de la cirugía. Probablemente notará que murphy abdomen está hinchado.  [place] es común. La hinchazón tardará varias semanas en desaparecer. Ramiro vez necesite entre 4 y 6 semanas hasta que se recupere por completo. Es importante que evite levantar objetos cinthia la recuperación para que pueda sanar. Esta hoja de [employer] idea general de cuánto tiempo tardará en recuperarse. Kala cada persona se recupera a un ritmo diferente. Siga los pasos a continuación para mejorar tan rápido phuong sea posible. Cómo puede cuidarse en el hogar? Actividad · Descanse cuando se sienta cansada. · Jenita Arleth. Caminar es nannette buena opción. · Deje que sane la corina. No se mueva con rapidez ni levante nada pesado hasta que se encuentre mejor. · Puede ducharse entre 24 y 50 horas después de la [place], si murphy médico lo [place]. Seque la incisión con toques suaves de toalla. No tome un baño de inmersión cinthia las primeras 2 semanas, o hasta que murphy médico lo apruebe. · Pregúntele a murphy médico cuándo puede tener [employer]. Alimentación · Puede seguir murphy dieta normal. Si tiene [employer], pruebe alimentos suaves y bajos en grasa, phuong arroz sin condimentar, kellee asado, pan heath y yogur. · Si no evacua los intestinos con regularidad mare después de la cirugía, trate de evitar el estreñimiento y hacer esfuerzos. Bhavana abundante agua. Murphy médico podría sugerirle que tome fibra, un ablandador de heces o un laxante [place]. Medicamentos · Murphy médico le dirá si puede volver a nalini lula medicamentos y cuándo puede volver a hacerlo. También le dará indicaciones sobre cualquier medicamento nuevo que deba nalini usted. · Si kole medicamentos que previenen la formación de coágulos de [facility], phuong warfarina (Coumadin), clopidogrel (Plavix) o aspirina, asegúrese de hablar con murphy médico. Él o tim le dirá si debe volver a nalini estos medicamentos y en qué momento. Asegúrese de que entiende exactamente lo que el médico quiere que jackie. · Sea armando con los medicamentos. Luna y siga todas las instrucciones de la [place]. ¨ Si el médico le recetó analgésicos (medicamentos para el dolor), tómelos según las indicaciones. ¨ Si no está tomando un analgésico recetado, pregúntele a murphy médico si puede nalini radha de [place]. · Si murphy médico le recetó antibióticos, tómelos según las indicaciones. No deje de tomarlos solo porque se sienta mejor. Debe nalini todos los antibióticos hasta terminarlos. Cuidado de la incisión · Es posible que tenga puntos de sutura sobre los mandujano (incisiones) que el médico le hizo en el abdomen. · Si tiene tiras de cinta adhesiva sobre el elmer (incisión) que hizo el médico, déjeselas puestas nannette semana o hasta que se caigan por sí solas. · Lave la corina a diario con agua tibia Halina y séquela con toques suaves de toalla. No use agua oxigenada ni alcohol. Pueden retrasar la sanación. · Puede cubrir la corina con nannette venda de gasa si le sale líquido o esta roza contra la ropa. · Cambie la venda todos los días. Otras instrucciones · Podría tener un leve sangrado vaginal. Use toallas sanitarias si es necesario. No se jackie lavados vaginales ni utilice tampones. · No tenga relaciones sexuales hasta que murphy médico lo apruebe. La atención de seguimiento es nannette parte clave de murphy tratamiento y seguridad. Asegúrese de hacer y acudir a todas las citas, y llame a murphy médico si está teniendo problemas. También es nannette buena idea saber los resultados de lula exámenes y mantener nannette lista de los medicamentos que kole. Cuándo debe pedir ayuda? Llame al 911 en cualquier momento que considere que puede necesitar atención de [place]. Por ejemplo, llame si: 
· Se desmayó (perdió el conocimiento). · Tiene dolor repentino en el pecho y falta de [place], o tose [place]. Llame a murphy médico ahora mismo o busque atención médica inmediata si: · Tiene sangrado vaginal intenso.  [place] significa que usted empapa lula toallas sanitarias habituales cada hora cinthia 2 horas o más. · Tiene dolor repentino e intenso en el abdomen o en la pelvis. · Tiene puntos de sutura flojos o se abre la incisión. · [address], tales phuong: ¨ Aumento del dolor, la hinchazón, la temperatura o el enrojecimiento. ¨ Vetas bueno que salen de la incisión. ¨ Pus que sale de la incisión. ¨ Ganglios linfáticos inflamados en el davis, las axilas o la margaret. Donna Sheen. Preste especial atención a los cambios en murphy maira y asegúrese de comunicarse con murphy médico si: 
· No mejora phuong se esperaba. Dónde puede encontrar más información en inglés? Omer Naomy a [email or web address]-kika.[email or web address] [facility] Q131 en la búsqueda para aprender más acerca de \"Histerectomía laparoscópica: Merari Bryan en el [facility] - [ Laparoscopic Hysterectomy: What to Expect at [facility] ]. \" 
Revisado: 23 noviembre, 2016 Versión del contenido: 11.3 © 8195-9090 Healthwise, Incorporated. Las instrucciones de cuidado fueron adaptadas bajo licencia por [employer]ctions (which disclaims liability or warranty for this information). Si usted tiene [place] afección médica o sobre estas instrucciones, siempre pregunte a murphy profesional de maira. Healthwise, Incorporated niega toda garantía o responsabilidad por murphy uso de esta información. Discharge Orders None Introducing [address]! [facility] introduce portal paciente MyChart . Ahora se puede acceder a partes de murphy expediente médico, enviar por correo electrónico la oficina de murphy médico y solicitar renovaciones de medicamentos en línea. En murphy navegador de Internet , Brandon Celeste a [email or web address] Gene clic en el usuario por Arelia Arms? Blaine Shells clic aquí en la sesión [facility]. Verá la página de registro [place]. Ingrese murphy código de [facility] win y phuong aparece a continuación.  Usted no tendrá que [employer] código después de delonte completado el proceso de registro . Si usted no se inscribe antes de la fecha de caducidad , debe solicitar un nuevo código. · MyChart Código de acceso : [id number] Expires: 11/23/2017  7:55 AM 
 
Ingresa los últimos cuatro dígitos de murphy Número de Seguro Social ( xxxx ) y fecha de nacimiento ( dd / mm / aaaa ) phuong se indica y gene clic en Enviar. Usted será llevado a la siguiente página de registro . Crear un ID MyChart . Esta será murphy ID de inicio de sesión de MyChart y no puede ser [place] , por lo que pensar en nannette que es Zenaida Leech y fácil de recordar . Crear nannette contraseña MyChart . Usted puede cambiar murphy contraseña en cualquier momento . Ingrese murphy Password Reset de preguntas y [place] . [place] se puede utilizar en un momento posterior si usted olvida murphy contraseña. Introduzca murphy dirección de correo electrónico . John Lopez recibirá nannette notificación por correo electrónico cuando la nueva información está disponible en MyChart . Nader pedersonic en Registrarse. Nisha Kamala allen y descargar porciones de murphy expediente médico. 
Gene clic en el enlace de descarga del menú Resumen para descargar nannette copia portátil de murphy información médica . Si tiene [employer] , por favor visite la sección de preguntas frecuentes del sitio web MyChart . Recuerde, MyChart NO es que se utilizará para las necesidades urgentes. Para emergencias médicas , llame al 911 . Ahora disponible en murphy iPhone y Android ! General Information Please provide this summary of care documentation to your next provider. Patient Signature:  ____________________________________________________________ Date:  ____________________________________________________________  
  
Earnstine Bonifacio Provider Signature:  ____________________________________________________________ Date:  ____________________________________________________________  
  
  
   
  
[address] [place] [address] 
[phone] Patient: Leobardo Daniels MRN: [id number] USK:9/18/8221 Tiene alergias a lo siguiente No tiene alergias Documentación recientes Height Weight BMI (IMC) Estado obstétrico Estatus de tabaquísmo 1.562 m 76.2 kg 31.23 kg/m2 Having regular periods Never Smoker Emergency Contacts Name Discharge Info Relation Home Work Mobile Mathur,Marie DISCHARGE CAREGIVER [3] Other Relative [6] [phone] Sobre ahmadi hospitalización Le admitieron el:  October 17, 2017 Ahmadi tratamiento más reciente fue el:  [facility] PS[facility]ATRIC [place] [address] [facility] SPECIAL CR Le dieron de iggy el:  October 18, 2017 Número de teléfono de la unidad:  [phone] Por qué le ingresaron Ahmadi diagnosis primaria es:  No está archivado/a Ahmadi diagnosis también incluye:  S/P Hysterectomy Proveedores de verse cinthia lula hospitalizaciones Personal Médico Rol Especialidad Teléfono principal de la oficina Daphney Rich MD Attending Provider Obstetrics & Gynecology [phone] Marco Singh MD Attending Provider Obstetrics & Gynecology [phone] Ahmadi médico de atención primaria (PCP ) Primary Care Physician Office Phone Office Fax Vikram DUGGAN [phone] Follow-up Information Follow up With Details Comments Contact Info Daphney Rich MD   [address] [facility] [address] [facility] 
[phone] Aprobación de la gestión actual lista de medicamentos EMPIECE a nalini [employer] Dosis e instrucciones Información de dispensación Comentarios Morning Noon Evening Bedtime  
 oxyCODONE-acetaminophen  mg per tablet También conocido phuong:  PERCOCET 10 Your last dose was: Your next dose is:    
   
   
 Dosis:  1-2 Tab Take 1-2 Tabs by mouth every four (4) hours as needed (pain). Max Daily Amount: 12 Tabs. cantidad:  30 Tab recargas:  0  
     
   
   
   
  
  
CONSULTE con ahmadi médico sobre [employer] Dosis e instrucciones Información de dispensación Comentarios Morning Noon Evening Bedtime  
 atorvastatin 10 mg tablet También conocido phuong:  LIPITOR Your last dose was: Your next dose is:    
   
   
 Dosis:  10 mg Take 1 Tab by mouth daily. cantidad:  30 Tab  
recargas:  5  
     
   
   
   
  
 esomeprazole 40 mg capsule También conocido phuong:  Carrol Faustin Your last dose was: Your next dose is: Take  by mouth as needed. recargas:  0  
     
   
   
   
  
 metFORMIN 1,000 mg tablet También conocido phuong:  GLUCOPHAGE Your last dose was: Your next dose is:    
   
   
 Dosis:  1000 mg Take 1 Tab by mouth two (2) times daily (with meals). cantidad:  60 Tab  
recargas:  5  
     
   
   
   
  
 TRESIBA FLEXTOUCH U-100 SC Your last dose was: Your next dose is:    
   
   
 Dosis:  64 Units 64 Units by SubCUTAneous route. Every other night. recargas:  0 Dónde puede recoger lula medicamentos Información sobre dónde obtener estos medicamentos se le dará a usted por la enfermera o el médico.   
 ! Pregunte a ahmadi médico o enfermero/a sobre estos medicamentos  
  oxyCODONE-acetaminophen  mg per tablet Discharge Instructions Histerectomía laparoscópica: Nereyda Fernandez en el hogar - [ Laparoscopic Hysterectomy: What to Expect at [facility] ] Ahmadi recuperación Jason Serrano es nannette cirugía para extraer el Robby Parker. En algunos casos, también se extraen los ovarios y las trompas de Falopio al [employer]. Usted puede esperar sentirse mejor y más kaor cada día, aunque es posible que necesite analgésicos (medicamentos para el dolor) por nannette o [place].  Es posible que se canse con facilidad o que tenga menos energía de lo habitual. El cansancio podría durar varias semanas después de la [facility]. Probablemente notará que murphy abdomen está hinchado. [place] es común. La hinchazón tardará varias semanas en desaparecer. Ramiro vez necesite entre 4 y 6 semanas hasta que se recupere por completo. Es importante que evite levantar objetos cinthia la recuperación para que pueda sanar. Esta hoja de [employer] idea general de cuánto tiempo tardará en recuperarse. Kala cada persona se recupera a un ritmo diferente. Siga los pasos a continuación para mejorar tan rápido phuong sea posible. Cómo puede cuidarse en el hogar? Actividad · Descanse cuando se sienta cansada. · [place]. Caminar es nannette buena opción. · Deje que sane la corina. No se mueva con rapidez ni levante nada pesado hasta que se encuentre mejor. · Puede ducharse entre 24 y 50 horas después de la [place], si murphy médico lo [place]. Seque la incisión con toques suaves de toalla. No tome un baño de inmersión cinthia las primeras 2 semanas, o hasta que murphy médico lo apruebe. · Pregúntele a murphy médico cuándo puede tener [address]. Alimentación · Puede seguir murphy dieta normal. Si tiene [employer], pruebe alimentos suaves y bajos en grasa, phuong arroz sin condimentar, kellee asado, pan heath y yogur. · Si no evacua los intestinos con regularidad mare después de la cirugía, trate de evitar el estreñimiento y hacer esfuerzos. Bhavana abundante agua. Murphy médico podría sugerirle que tome fibra, un ablandador de heces o un laxante [place]. Medicamentos · Murphy médico le dirá si puede volver a nalini lula medicamentos y cuándo puede volver a hacerlo. También le dará indicaciones sobre cualquier medicamento nuevo que deba nalini usted. · Si kole medicamentos que previenen la formación de coágulos de [place], phuong warfarina (Coumadin), clopidogrel (Plavix) o aspirina, asegúrese de hablar con murphy médico. Él o tim le dirá si debe volver a nalini estos medicamentos y en qué momento.  Asegúrese de que entiende exactamente lo que el médico quiere que jackie. · Sea armando con los medicamentos. [place] y siga todas las instrucciones de la [place]. ¨ Si el médico le recetó analgésicos (medicamentos para el dolor), tómelos según las indicaciones. ¨ Si no está tomando un analgésico recetado, pregúntele a murphy médico si puede nalini radha de [employer]. · Si murphy médico le recetó antibióticos, tómelos según las indicaciones. No deje de tomarlos solo porque se sienta mejor. Debe nalini todos los antibióticos hasta terminarlos. Cuidado de la incisión · Es posible que tenga puntos de sutura sobre los mandujano (incisiones) que el médico le hizo en el abdomen. · Si tiene tiras de cinta adhesiva sobre el elmer (incisión) que hizo el médico, déjeselas puestas nannette semana o hasta que se caigan por sí solas. · Lave la corina a diario con agua tibia Halina y séquela con toques suaves de toalla. No use agua oxigenada ni alcohol. Pueden retrasar la sanación. · Puede cubrir la corina con nannette venda de gasa si le sale líquido o esta roza contra la ropa. · Cambie la venda todos los días. Otras instrucciones · Podría tener un leve sangrado vaginal. Use toallas sanitarias si es necesario. No se jackie lavados vaginales ni utilice tampones. · No tenga relaciones sexuales hasta que murphy médico lo apruebe. La atención de seguimiento es nannette parte clave de murphy tratamiento y seguridad. Asegúrese de hacer y acudir a todas las citas, y llame a murphy médico si está teniendo problemas. También es nannette buena idea saber los resultados de lula exámenes y mantener nannette lista de los medicamentos que kole. Cuándo debe pedir ayuda? Llame al 911 en cualquier momento que considere que puede necesitar atención de [place]. Por ejemplo, llame si: 
· Se desmayó (perdió el conocimiento). · Tiene dolor repentino en el pecho y falta de [place], o tose [place].  
Llame a murphy médico ahora mismo o busque atención médica inmediata si: 
 · Tiene sangrado vaginal intenso. [place] significa que usted empapa lula toallas sanitarias habituales cada hora cinthia 2 horas o más. · Tiene dolor repentino e intenso en el abdomen o en la pelvis. · Tiene puntos de sutura flojos o se abre la incisión. · [address], tales phuong: ¨ Aumento del dolor, la hinchazón, la temperatura o el enrojecimiento. ¨ Vetas bueno que salen de la incisión. ¨ Pus que sale de la incisión. ¨ Ganglios linfáticos inflamados en el davis, las axilas o la margaret. Silvina Rolls. Preste especial atención a los cambios en murphy maira y asegúrese de comunicarse con murphy médico si: 
· No mejora phuong se esperaba. Dónde puede encontrar más información en inglés? Kiley Zaragoza a [email or web address]-kika.info/. Lyndsay Pham Q131 en la búsqueda para aprender más acerca de \"Histerectomía laparoscópica: Caretha Brittle en el [facility] - [ Laparoscopic Hysterectomy: What to Expect at [facility] ]. \" 
Revisado: 23 noviembre, 2016 Versión del contenido: 11.3 © 7399-8614 Healthwise, Incorporated. Las instrucciones de cuidado fueron adaptadas bajo licencia por [employer] (which disclaims liability or warranty for this information). Si usted tiene [place] afección médica o sobre estas instrucciones, siempre pregunte a murphy profesional de maira. Healthwise, Incorporated niega toda garantía o responsabilidad por murphy uso de esta información. Discharge Orders [employer] Introducing [facility] SERVICES! [facility] introduce portal paciente MyChart . Ahora se puede acceder a partes de murphy expediente médico, enviar por correo electrónico la oficina de murphy médico y solicitar renovaciones de medicamentos en línea. En murphy navegador de Internet , Lazarus Hepzibah a [email or web address] Gene clic en el usuario por Gavino Ingcam? Laura Hensleys daciaic aquí en la sesión Norvel Shalini. Verá la página de registro [place]. Ingrese murphy código de [facility] win y phuong aparece a continuación. Usted no tendrá que [employer] código después de delonte completado el proceso de registro . Si usted no se inscribe antes de la fecha de caducidad , debe solicitar un nuevo código. · MyChart Código de acceso : [id number] Expires: 11/23/2017  7:55 AM 
 
Ingresa los últimos cuatro dígitos de murphy Número de Seguro Social ( xxxx ) y fecha de nacimiento ( dd / mm / aaaa ) phuong se indica y gene clic en Enviar. Usted será llevado a la siguiente página de registro . Crear un ID MyChart . Esta será murphy ID de inicio de sesión de MyChart y no puede ser [place] , por lo que pensar en nannette que es Aylin Pine y fácil de recordar . Crear nannette contraseña MyChart . Usted puede cambiar murphy contraseña en cualquier momento . Ingrese murphy Password Reset de preguntas y [place] . Anselmo se puede utilizar en un momento posterior si usted olvida murphy contraseña. Introduzca murphy dirección de correo electrónico . Jagdeep Ramos recibirá nannette notificación por correo electrónico cuando la nueva información está disponible en MyChart . Elle Cowing clic en Registrarse. Alpha Joel allen y descargar porciones de murphy expediente médico. 
Gene clic en el enlace de descarga del menú Resumen para descargar nannette copia portátil de murphy información médica . Si tiene [employer] , por favor visite la sección de preguntas frecuentes del sitio web MyChart . Recuerde, MyChart NO es que se utilizará para las necesidades urgentes. Para emergencias médicas , llame al 911 . Ahora disponible en murphy iPhone y Android ! General Information Please provide this summary of care documentation to your next provider. Patient Signature:  ____________________________________________________________ Date:  ____________________________________________________________  
  
Kay Ordway Provider Signature:  ____________________________________________________________ Date:  ____________________________________________________________

## 2017-10-17 NOTE — IP AVS SNAPSHOT
[address] 
[phone] Patient: Milagros Leahy MRN: [id number] Current Discharge Medication List  
  
START taking these medications Dose & Instructions Dispensing Information Comments Morning Noon Evening Bedtime  
 oxyCODONE-acetaminophen  mg per tablet Commonly known as:  PERCOCET 10 Your last dose was: Your next dose is:    
   
   
 Dose:  1-2 Tab Take 1-2 Tabs by mouth every four (4) hours as needed (pain). Max Daily Amount: 12 Tabs. Quantity:  30 Tab Refills:  0 ASK your doctor about these medications Dose & Instructions Dispensing Information Comments Morning Noon Evening Bedtime  
 atorvastatin 10 mg tablet Commonly known as:  LIPITOR Your last dose was: Your next dose is:    
   
   
 Dose:  10 mg Take 1 Tab by mouth daily. Quantity:  30 Tab Refills:  5  
     
   
   
   
  
 esomeprazole 40 mg capsule Commonly known as:  Bethene Piper Your last dose was: Your next dose is: Take  by mouth as needed. Refills:  0  
     
   
   
   
  
 metFORMIN 1,000 mg tablet Commonly known as:  GLUCOPHAGE Your last dose was: Your next dose is:    
   
   
 Dose:  1000 mg Take 1 Tab by mouth two (2) times daily (with meals). Quantity:  60 Tab Refills:  5  
     
   
   
   
  
 TRESIBA FLEXTOUCH U-100 SC Your last dose was: Your next dose is:    
   
   
 Dose:  64 Units 64 Units by SubCUTAneous route. Every other night. Refills:  0 Where to Get Your Medications Information on where to get these meds will be given to you by the nurse or doctor. ! Ask your nurse or doctor about these medications  
  oxyCODONE-acetaminophen  mg per tablet

## 2017-10-17 NOTE — PROGRESS NOTES
TRANSFER - IN REPORT:    Verbal report received from Karen Stone RN (name) on Brain Lev  being received from PACU (unit) for routine post - op      Report consisted of patients Situation, Background, Assessment and   Recommendations(SBAR). Information from the following report(s) SBAR, OR Summary, Procedure Summary, Intake/Output, MAR and Accordion was reviewed with the receiving nurse. Opportunity for questions and clarification was provided. Assessment completed upon patients arrival to unit and care assumed.

## 2017-10-17 NOTE — H&P
Gynecology History and Physical    Name: Emilie Garcia MRN: [id number] SSN: [id number]    YOB: 1970  Age: 56 y.o. Sex: female       Subjective:      Chief complaint:  Menorrhagia    Jace Cassidy is a 56 y.o.  female with a history of menorrhagia. Previous workup included Ultrasound which revealed fibroid(s) and an endometrial biopsy which was benign. Previous treatment measures included nonsteroidal anti-inflammatory drugs (NSAIDs), observation and oral contraceptives. She is admitted for Procedure(s) (LRB):  ROBOTIC ASSISTED TOTAL HYSTERECTOMY, BILATERAL SALPINGECTOMIES (Bilateral). The current method of family planning is tubal ligation. OB History     No data available        Past Medical History:   Diagnosis Date    Arthritis     Chronic headaches     Diabetes ([address]     GERD (gastroesophageal reflux disease)     Hypercholesterolemia     PUD (peptic ulcer disease)      Past Surgical History:   Procedure Laterality Date    BREAST SURGERY PROCEDURE UNLISTED      right breast biopsy     Social History     Occupational History    Not on file. Social History Main Topics    Smoking status: Never Smoker    Smokeless tobacco: Never Used    Alcohol use No    Drug use: No    Sexual activity: Not on file     Family History   Problem Relation Age of Onset    Diabetes Paternal Grandmother     No Known Problems Mother     No Known Problems Father     Anesth Problems Neg Hx         No Known Allergies  Prior to Admission medications    Medication Sig Start Date End Date Taking? Authorizing Provider   INSULIN DEGLUDEC (TRESIBA FLEXTOUCH U-100 SC) 64 Units by SubCUTAneous route. Every other night. Historical Provider   esomeprazole (NEXIUM) 40 mg capsule Take  by mouth as needed. Historical Provider   metFORMIN (GLUCOPHAGE) 1,000 mg tablet Take 1 Tab by mouth two (2) times daily (with meals).  2/26/16   Jimbo Rodriguez MD   atorvastatin (LIPITOR) 10 mg tablet Take 1 Tab by mouth daily. 2/26/16   Holden Hernandez MD        Review of Systems:  A comprehensive review of systems was negative except for that written in the History of Present Illness. Objective: There were no vitals filed for this visit. Physical Exam:  Patient without distress. Heart: Regular rate and rhythm or S1S2 present  Lung: clear to auscultation throughout lung fields, no wheezes, no rales, no rhonchi and normal respiratory effort  Abdomen: soft, nontender    Assessment:     Active Problems:    * No active hospital problems. *     Fibroids with fibroids    Plan:     Procedure(s) (LRB):  ROBOTIC ASSISTED TOTAL HYSTERECTOMY, BILATERAL SALPINGECTOMIES (Bilateral)  Discussed the risks of surgery including the risks of bleeding, infection, deep vein thrombosis, and surgical injuries to internal organs including but not limited to the bowels, bladder, rectum, and female reproductive organs. The patient understands the risks; any and all questions were answered to the patient's satisfaction.     Signed By:  Ton Hernandez MD     October 17, 2017

## 2017-10-17 NOTE — PERIOP NOTES
Patient: Emilie Garcia MRN: [id number]  SSN: [id number]   YOB: 1970  Age: 56 y.o. Sex: female     Patient is status post Procedure(s):  ROBOTIC ASSISTED TOTAL HYSTERECTOMY, BILATERAL SALPINGECTOMIES. Surgeon(s) and Role: Deepti Roberts MD - Sheridan Diaz MD     * Jo Marcos MD - Primary    Local/Dose/Irrigation:  0.5% bupivacaine                  Peripheral IV 10/10/17 Left; Inner Arm (Active)                           Dressing/Packing:  Wound Abdomen-DRESSING TYPE: Topical skin adhesive/glue; Wendy-pad (10/17/17 1100)  Splint/Cast:  ]    Other:

## 2017-10-17 NOTE — PERIOP NOTES
PATIENT INTERVIEWED IN PREOP WITH  (). NAME BAND VISIBLE AND CORRECT PER PATIENT. PATIENT HAS UNDERSTANDING OF PROCEDURE AND SURGICAL SITE. EDUCATIONAL NEEDS MET. PATIENT STATES NO PAIN AT THIS TIME.

## 2017-10-17 NOTE — ANESTHESIA PREPROCEDURE EVALUATION
Anesthetic History   No history of anesthetic complications            Review of Systems / Medical History  Patient summary reviewed, nursing notes reviewed and pertinent labs reviewed    Pulmonary  Within defined limits                 Neuro/Psych   Within defined limits           Cardiovascular  Within defined limits                     GI/Hepatic/Renal     GERD      PUD     Endo/Other    Diabetes    Obesity and arthritis     Other Findings              Physical Exam    Airway  Mallampati: II  TM Distance: > 6 cm  Neck ROM: normal range of motion   Mouth opening: Normal     Cardiovascular  Regular rate and rhythm,  S1 and S2 normal,  no murmur, click, rub, or gallop             Dental    Dentition: Caps/crowns     Pulmonary  Breath sounds clear to auscultation               Abdominal  GI exam deferred       Other Findings            Anesthetic Plan    ASA: 2  Anesthesia type: general          Induction: Intravenous  Anesthetic plan and risks discussed with: Patient

## 2017-10-17 NOTE — OP NOTES
[address] [place] [facility] [address], [address]   OP NOTE       Name:  Micky Kanner   MR#:  [id number]   :  1970   Account #:  [de-identified]    Surgery Date:  10/17/2017   Date of Adm:  10/17/2017       PREOPERATIVE DIAGNOSIS:   Symptomatic fibroid uterus. POSTOPERATIVE DIAGNOSIS:   Symptomatic fibroid uterus. PROCEDURES PERFORMED:  Autumn Brian laparoscopic total   hysterectomy with bilateral salpingectomy. SURGEON: Dr. Korey Cadet    ASSISTANT: Dr. Shanae Sanchezeller: 50 mL. SPECIMENS REMOVED: Uterus, cervix, bilateral fallopian tubes. ANESTHESIA:  General endotracheal.    FINDINGS: With laparoscopy, the tubes and ovaries appeared normal   bilaterally. The uterus appeared enlarged. DESCRIPTION OF PROCEDURE: The patient was taken to the   operating room, placed on the operating room table. After adequate   anesthesia was obtained, she was placed in the dorsal lithotomy   position. The abdomen, perineum and vagina were prepped and the   patient was draped in the usual sterile fashion. A Greer catheter was   placed. A sterile speculum was placed and a single-tooth tenaculum   was placed on the anterior lip of the cervix. A uterine manipulator was   placed and all other instruments were removed. The umbilical skin and bilateral port site skin was infiltrated with   Marcaine. The umbilical skin was grasped with towel clips and tented. The Veress needle was inserted. The abdomen was insufflated to   approximately 2 liters of CO2 gas. The Veress needle was removed. A   small incision was made, an 8-mm da Rahul port was placed. Laparoscopy verified successful entry. The patient was placed in   Trendelenburg position, 8-mm da Rahul ports were placed bilaterally   under direct visualization. Pelvic organs were manipulated and   visualized with findings as above. The [employer] robot was docked and   instruments were placed.  Attention was turned to the right utero-  ovarian ligament, round ligament, and fallopian tube, which were   cauterized using bipolar cautery and cut using the hot parveen. The right   side of the bladder flap was created. Attention was turned to the left   utero-ovarian ligament, round ligament, and fallopian tube, which were   cauterized and cut in a similar fashion and the left side of the bladder   flap was created. The uterine vessels were bilaterally cauterized. The   vaginal cuff was entered posteriorly using the guidance of the uterine   manipulator cup, the uterus and cervix were amputated from the   vagina. The uterus and cervix were delivered through the vagina and   the vaginal cuff was closed using a running suture of 2-0 Stratafix. There was good hemostasis. Attention was turned to the right fallopian   tube, where the mesosalpinx was cauterized and cut using the hot   parveen and the tube was removed through the assistant port. Attention   was turned to the left mesosalpinx, which was cauterized and cut in a   similar fashion and the left tube was removed through the assistant   port. There was good hemostasis at all sites. The pelvis was irrigated   and suctioned. Tisseel was placed over the operative areas as well as   Michelle for hemostasis. The robot was removed from the patient. All   instruments were removed. The abdomen was desufflated. The skin   incisions were closed using subcuticular sutures of 4-0 Monocryl and   Dermabond. There was clear urine draining in the Rgeer at the end of   the procedure. All sponge, needle and instrument counts were correct   x2 at the end of the procedure. The patient tolerated the procedure   well and was taken to the post-anesthesia care unit in satisfactory   condition.              MD NAVI Olivares / Nataly Meeker   D:  10/17/2017   11:30   T:  10/17/2017   12:05   Job #:  [id number]

## 2017-10-18 VITALS
TEMPERATURE: 98.2 F | DIASTOLIC BLOOD PRESSURE: 63 MMHG | BODY MASS INDEX: 30.91 KG/M2 | RESPIRATION RATE: 18 BRPM | HEART RATE: 86 BPM | WEIGHT: 168 LBS | HEIGHT: 62 IN | OXYGEN SATURATION: 97 % | SYSTOLIC BLOOD PRESSURE: 113 MMHG

## 2017-10-18 LAB
ALBUMIN SERPL-MCNC: 2.7 G/DL (ref 3.5–5)
ALBUMIN/GLOB SERPL: 0.8 {RATIO} (ref 1.1–2.2)
ALP SERPL-CCNC: 127 U/L (ref 45–117)
ALT SERPL-CCNC: 24 U/L (ref 12–78)
ANION GAP SERPL CALC-SCNC: 7 MMOL/L (ref 5–15)
AST SERPL-CCNC: 11 U/L (ref 15–37)
BASOPHILS # BLD: 0 K/UL (ref 0–0.1)
BASOPHILS NFR BLD: 0 % (ref 0–1)
BILIRUB SERPL-MCNC: 0.3 MG/DL (ref 0.2–1)
BUN SERPL-MCNC: 7 MG/DL (ref 6–20)
BUN/CREAT SERPL: 10 (ref 12–20)
CALCIUM SERPL-MCNC: 8.6 MG/DL (ref 8.5–10.1)
CHLORIDE SERPL-SCNC: 103 MMOL/L (ref 97–108)
CO2 SERPL-SCNC: 25 MMOL/L (ref 21–32)
CREAT SERPL-MCNC: 0.72 MG/DL (ref 0.55–1.02)
DIFFERENTIAL METHOD BLD: ABNORMAL
EOSINOPHIL # BLD: 0 K/UL (ref 0–0.4)
EOSINOPHIL NFR BLD: 0 % (ref 0–7)
ERYTHROCYTE [DISTWIDTH] IN BLOOD BY AUTOMATED COUNT: 20.6 % (ref 11.5–14.5)
GLOBULIN SER CALC-MCNC: 3.3 G/DL (ref 2–4)
GLUCOSE BLD STRIP.AUTO-MCNC: 269 MG/DL (ref 65–100)
GLUCOSE BLD STRIP.AUTO-MCNC: 275 MG/DL (ref 65–100)
GLUCOSE SERPL-MCNC: 252 MG/DL (ref 65–100)
HCT VFR BLD AUTO: 35.3 % (ref 35–47)
HGB BLD-MCNC: 11.4 G/DL (ref 11.5–16)
LYMPHOCYTES # BLD: 2.1 K/UL (ref 0.8–3.5)
LYMPHOCYTES NFR BLD: 17 % (ref 12–49)
MCH RBC QN AUTO: 25.2 PG (ref 26–34)
MCHC RBC AUTO-ENTMCNC: 32.3 G/DL (ref 30–36.5)
MCV RBC AUTO: 78.1 FL (ref 80–99)
MONOCYTES # BLD: 0.6 K/UL (ref 0–1)
MONOCYTES NFR BLD: 5 % (ref 5–13)
NEUTS SEG # BLD: 9.5 K/UL (ref 1.8–8)
NEUTS SEG NFR BLD: 78 % (ref 32–75)
PLATELET # BLD AUTO: 419 K/UL (ref 150–400)
POTASSIUM SERPL-SCNC: 3.7 MMOL/L (ref 3.5–5.1)
PROT SERPL-MCNC: 6 G/DL (ref 6.4–8.2)
RBC # BLD AUTO: 4.52 M/UL (ref 3.8–5.2)
RBC MORPH BLD: ABNORMAL
RBC MORPH BLD: ABNORMAL
SERVICE CMNT-IMP: ABNORMAL
SERVICE CMNT-IMP: ABNORMAL
SODIUM SERPL-SCNC: 135 MMOL/L (ref 136–145)
WBC # BLD AUTO: 12.2 K/UL (ref 3.6–11)

## 2017-10-18 PROCEDURE — 82962 GLUCOSE BLOOD TEST: CPT

## 2017-10-18 PROCEDURE — 74011250637 HC RX REV CODE- 250/637: Performed by: SPECIALIST

## 2017-10-18 PROCEDURE — 36415 COLL VENOUS BLD VENIPUNCTURE: CPT | Performed by: SPECIALIST

## 2017-10-18 PROCEDURE — 77010033678 HC OXYGEN DAILY

## 2017-10-18 PROCEDURE — 85025 COMPLETE CBC W/AUTO DIFF WBC: CPT | Performed by: SPECIALIST

## 2017-10-18 PROCEDURE — 80053 COMPREHEN METABOLIC PANEL: CPT | Performed by: SPECIALIST

## 2017-10-18 PROCEDURE — 99218 HC RM OBSERVATION: CPT

## 2017-10-18 PROCEDURE — 74011636637 HC RX REV CODE- 636/637: Performed by: SPECIALIST

## 2017-10-18 RX ORDER — OXYCODONE AND ACETAMINOPHEN 10; 325 MG/1; MG/1
1-2 TABLET ORAL
Qty: 30 TAB | Refills: 0 | Status: SHIPPED | OUTPATIENT
Start: 2017-10-18

## 2017-10-18 RX ADMIN — OXYCODONE HYDROCHLORIDE AND ACETAMINOPHEN 2 TABLET: 10; 325 TABLET ORAL at 04:13

## 2017-10-18 RX ADMIN — INSULIN LISPRO 5 UNITS: 100 INJECTION, SOLUTION INTRAVENOUS; SUBCUTANEOUS at 11:57

## 2017-10-18 RX ADMIN — OXYCODONE HYDROCHLORIDE AND ACETAMINOPHEN 2 TABLET: 10; 325 TABLET ORAL at 09:17

## 2017-10-18 RX ADMIN — INSULIN LISPRO 5 UNITS: 100 INJECTION, SOLUTION INTRAVENOUS; SUBCUTANEOUS at 08:11

## 2017-10-18 NOTE — PROGRESS NOTES
I have reviewed discharge instructions with the patient and spouse. The patient and spouse verbalized understanding. IV removed from L arm.

## 2017-10-18 NOTE — PROGRESS NOTES
Gynecology Progress Note    Kwame Willis    She is without significant complaints. Pain controlled on current medication. Voiding without difficulty. Patient is passing flatus. She is is tolerating her diet.     Vitals:  Temp (24hrs), Av.9 °F (36.6 °C), Min:97 °F (36.1 °C), Max:98.4 °F (36.9 °C)      Visit Vitals    /63 (BP 1 Location: Left arm, BP Patient Position: At rest)    Pulse 86    Temp 98.2 °F (36.8 °C)    Resp 18    Ht 5' 1.5\" (1.562 m)    Wt 76.2 kg (168 lb)    SpO2 97%    BMI 31.23 kg/m2        Intake and Output:   Current shift: 10/18 0701 - 10/18 1900  In: -   Out: 300 [Urine:300]  Last 3 completed shifts: 10/16 1901 - 10/18 0700  In: 1160 [P.O.:360; I.V.:800]  Out: 1500 [Urine:1450]      Exam:  General: alert, cooperative, no distress, appears stated age     Lung: clear to auscultation bilaterally     Heart: regular rate and rhythm, S1, S2 normal, no murmur, click, rub or gallop     Abdomen: incision c/d/i     Extremities: extremities normal, atraumatic, no cyanosis or edema, no edema      Labs:   Lab Results   Component Value Date/Time    WBC 12.2 10/18/2017 05:03 AM    WBC 9.1 10/13/2017 09:47 AM    WBC 7.9 2016 03:55 PM    HGB 11.4 10/18/2017 05:03 AM    HGB 13.7 10/13/2017 09:47 AM    HGB 12.6 2016 03:55 PM    HCT 35.3 10/18/2017 05:03 AM    HCT 42.4 10/13/2017 09:47 AM    HCT 38.9 2016 03:55 PM    PLATELET 950  05:03 AM    PLATELET 150  09:47 AM    PLATELET 105  03:55 PM       Recent Results (from the past 24 hour(s))   GLUCOSE, POC    Collection Time: 10/17/17  1:15 PM   Result Value Ref Range    Glucose (POC) 164 (H) 65 - 100 mg/dL    Performed by Oumou Peña, POC    Collection Time: 10/17/17  4:42 PM   Result Value Ref Range    Glucose (POC) 166 (H) 65 - 100 mg/dL    Performed by Lakia GARDNER    GLUCOSE, POC    Collection Time: 10/17/17 10:27 PM   Result Value Ref Range    Glucose (POC) 235 (H) 65 - 100 mg/dL Performed by Cecily Jenkins    CBC WITH AUTOMATED DIFF    Collection Time: 10/18/17  5:03 AM   Result Value Ref Range    WBC 12.2 (H) 3.6 - 11.0 K/uL    RBC 4.52 3.80 - 5.20 M/uL    HGB 11.4 (L) 11.5 - 16.0 g/dL    HCT 35.3 35.0 - 47.0 %    MCV 78.1 (L) 80.0 - 99.0 FL    MCH 25.2 (L) 26.0 - 34.0 PG    MCHC 32.3 30.0 - 36.5 g/dL    RDW 20.6 (H) 11.5 - 14.5 %    PLATELET 249 (H) 114 - 400 K/uL    NEUTROPHILS 78 (H) 32 - 75 %    LYMPHOCYTES 17 12 - 49 %    MONOCYTES 5 5 - 13 %    EOSINOPHILS 0 0 - 7 %    BASOPHILS 0 0 - 1 %    ABS. NEUTROPHILS 9.5 (H) 1.8 - 8.0 K/UL    ABS. LYMPHOCYTES 2.1 0.8 - 3.5 K/UL    ABS. MONOCYTES 0.6 0.0 - 1.0 K/UL    ABS. EOSINOPHILS 0.0 0.0 - 0.4 K/UL    ABS. BASOPHILS 0.0 0.0 - 0.1 K/UL    DF SMEAR SCANNED      RBC COMMENTS ANISOCYTOSIS  1+        RBC COMMENTS MICROCYTOSIS  1+       METABOLIC PANEL, COMPREHENSIVE    Collection Time: 10/18/17  5:03 AM   Result Value Ref Range    Sodium 135 (L) 136 - 145 mmol/L    Potassium 3.7 3.5 - 5.1 mmol/L    Chloride 103 97 - 108 mmol/L    CO2 25 21 - 32 mmol/L    Anion gap 7 5 - 15 mmol/L    Glucose 252 (H) 65 - 100 mg/dL    BUN 7 6 - 20 MG/DL    Creatinine 0.72 0.55 - 1.02 MG/DL    BUN/Creatinine ratio 10 (L) 12 - 20      GFR est AA >60 >60 ml/min/1.73m2    GFR est non-AA >60 >60 ml/min/1.73m2    Calcium 8.6 8.5 - 10.1 MG/DL    Bilirubin, total 0.3 0.2 - 1.0 MG/DL    ALT (SGPT) 24 12 - 78 U/L    AST (SGOT) 11 (L) 15 - 37 U/L    Alk.  phosphatase 127 (H) 45 - 117 U/L    Protein, total 6.0 (L) 6.4 - 8.2 g/dL    Albumin 2.7 (L) 3.5 - 5.0 g/dL    Globulin 3.3 2.0 - 4.0 g/dL    A-G Ratio 0.8 (L) 1.1 - 2.2     GLUCOSE, POC    Collection Time: 10/18/17  8:02 AM   Result Value Ref Range    Glucose (POC) 269 (H) 65 - 100 mg/dL    Performed by [address] [place] [address], POC    Collection Time: 10/18/17 11:48 AM   Result Value Ref Range    Glucose (POC) 275 (H) 65 - 100 mg/dL    Performed by Ghassan العراقي        Assesment: POD#1  Da Rahul laparoscopic total   hysterectomy with bilateral salpingectomy  Doing well  Plan: Discharge home today with: Medications: medications prior to admission Follow up: 2 weeks Diet: as tolerated Activity: no heavy lifting

## 2017-10-18 NOTE — DISCHARGE INSTRUCTIONS
Histerectomía laparoscópica: Sandylouis Fernándezsarah en el hogar - [ Laparoscopic Hysterectomy: What to Expect at [facility] ]  Murphy recuperación    [place] es nannette cirugía para extraer el [place]. En algunos casos, también se extraen los ovarios y las trompas de [place] al [employer]. Usted puede esperar sentirse mejor y más karo cada día, aunque es posible que necesite analgésicos (medicamentos para el dolor) por nannette o [place]. Es posible que se canse con facilidad o que tenga menos energía de lo habitual. El cansancio podría durar varias semanas después de la cirugía. Probablemente notará que murphy abdomen está hinchado. [place] es común. La hinchazón tardará varias semanas en desaparecer. Ramiro vez necesite entre 4 y 6 semanas hasta que se recupere por completo. Es importante que evite levantar objetos cinthia la recuperación para que pueda sanar. Esta hoja de [employer] idea general de cuánto tiempo tardará en recuperarse. Kala cada persona se recupera a un ritmo diferente. Siga los pasos a continuación para mejorar tan rápido phuong sea posible. ¿Cómo puede cuidarse en el [facility]? Actividad  · Descanse cuando se sienta cansada. · Danetta [place]. Caminar es nannette buena opción. · Deje que sane la corina. No se mueva con rapidez ni levante nada pesado hasta que se encuentre mejor. · Puede ducharse entre 24 y 50 horas después de la [place], si murphy médico lo [place]. Seque la incisión con toques suaves de toalla. No tome un baño de inmersión cinthia las primeras 2 semanas, o hasta que murphy médico lo apruebe. · Pregúntele a murphy médico cuándo puede tener [employer]. Alimentación  · Puede seguir murphy dieta normal. Si tiene [employer], pruebe alimentos suaves y bajos en grasa, phuong arroz sin condimentar, kellee asado, pan heath y yogur. · Si no evacua los intestinos con regularidad mare después de la cirugía, trate de evitar el estreñimiento y hacer esfuerzos. Bhavana abundante agua.  Murphy médico podría sugerirle que tome fibra, un ablandador de heces o un laxante [place]. Medicamentos  · Murphy médico le dirá si puede volver a nalini lula medicamentos y cuándo puede volver a hacerlo. También le dará indicaciones sobre cualquier medicamento nuevo que deba nalini usted. · Si kole medicamentos que previenen la formación de coágulos de Amisha, phuong warfarina (Coumadin), clopidogrel (Plavix) o aspirina, asegúrese de hablar con murphy médico. Él o tim le dirá si debe volver a nalini estos medicamentos y en qué momento. Asegúrese de que entiende exactamente lo que el médico quiere que jackie. · Sea armando con los medicamentos. Luna y siga todas las instrucciones de la [place]. ¨ Si el médico le recetó analgésicos (medicamentos para el dolor), tómelos según las indicaciones. ¨ Si no está tomando un analgésico recetado, pregúntele a murphy médico si puede nalini radha de [employer]. · Si murphy médico le recetó antibióticos, tómelos según las indicaciones. No deje de tomarlos solo porque se sienta mejor. Debe nalini todos los antibióticos hasta terminarlos. Cuidado de la incisión  · Es posible que tenga puntos de sutura sobre los mandujano (incisiones) que el médico le hizo en el abdomen. · Si tiene tiras de cinta adhesiva sobre el elmer (incisión) que hizo el médico, déjeselas puestas nannette semana o hasta que se caigan por sí solas. · Lave la corina a diario con agua tibia Halina y séquela con toques suaves de toalla. No use agua oxigenada ni alcohol. Pueden retrasar la sanación. · Puede cubrir la corina con nannette venda de gasa si le sale líquido o esta roza contra la ropa. · Cambie la venda todos los días. Otras instrucciones  · Podría tener un leve sangrado vaginal. Use toallas sanitarias si es necesario. No se jackie lavados vaginales ni utilice tampones. · No tenga relaciones sexuales hasta que murphy médico lo apruebe. La atención de seguimiento es nannette parte clave de murphy tratamiento y seguridad.  Asegúrese de hacer y acudir a todas las citas, y llame a murphy médico si está teniendo problemas. También es nannette buena idea saber los resultados de lula exámenes y mantener nannette lista de los medicamentos que kole. ¿Cuándo debe pedir ayuda? Llame al 911 en cualquier momento que considere que puede necesitar atención de [place]. Por ejemplo, llame si:  · Se desmayó (perdió el conocimiento). · Tiene dolor repentino en el pecho y falta de [place], o tose [place]. Llame a murphy médico ahora mismo o busque atención médica inmediata si:  · Tiene sangrado vaginal intenso. [place] significa que usted empapa ulla toallas sanitarias habituales cada hora cinthia 2 horas o más. · Tiene dolor repentino e intenso en el abdomen o en la pelvis. · Tiene puntos de sutura flojos o se abre la incisión. · Tiene señales de infección, tales phuong:  ¨ Aumento del dolor, la hinchazón, la temperatura o el enrojecimiento. ¨ Vetas bueno que salen de la incisión. ¨ Pus que sale de la incisión. ¨ Ganglios linfáticos inflamados en el davis, las axilas o la margaret. Earna Chemo. Preste especial atención a los cambios en murphy maira y asegúrese de comunicarse con murphy médico si:  · No mejora phuong se esperaba. ¿Dónde puede encontrar más información en inglés? Dayron Crespo a [email or web address]-kika.info/. Joana Diaz Q131 en la búsqueda para aprender más acerca de \"Histerectomía laparoscópica: Elenita Fall en el hogar - [ Laparoscopic Hysterectomy: What to Expect at [facility] ]. \"  Revisado: 23 noviembre, 2016  Versión del contenido: 11.3  © 5839-3126 Healthwise, Incorporated. Las instrucciones de cuidado fueron adaptadas bajo licencia por [employer] (which disclaims liability or warranty for this information). Si usted tiene [place] afección médica o sobre estas instrucciones, siempre pregunte a murphy profesional de maira. Healthwise, Incorporated niega toda garantía o responsabilidad por murphy uso de esta información.

## 2017-10-19 NOTE — ADT AUTH CERT NOTES
Srinivas Mccann MD Physician Signed Obstetrics & Gynecology Progress Notes Date of Service: 10/18/17 1208      Expand All Collapse All    []Hide copied text  Gynecology Progress Note     Trace Flor     She is without significant complaints. Pain controlled on current medication. Voiding without difficulty. Patient is passing flatus.  She is is tolerating her diet.     Vitals:  Temp (24hrs), Av.9 °F (36.6 °C), Min:97 °F (36.1 °C), Max:98.4 °F (36.9 °C)             Visit Vitals    /63 (BP 1 Location: Left arm, BP Patient Position: At rest)    Pulse 86    Temp 98.2 °F (36.8 °C)    Resp 18    Ht 5' 1.5\" (1.562 m)    Wt 76.2 kg (168 lb)    SpO2 97%    BMI 31.23 kg/m2         Intake and Output:   Current shift: 10/18 0701 - 10/18 1900  In: -   Out: 300 [Urine:300]  Last 3 completed shifts: 10/16 1901 - 10/18 0700  In: 1160 [P.O.:360; I.V.:800]  Out: 1500 [Urine:1450]        Exam:  General: alert, cooperative, no distress, appears stated age                          Lung: clear to auscultation bilaterally                          Heart: regular rate and rhythm, S1, S2 normal, no murmur, click, rub or gallop                          Abdomen: incision c/d/i                          Extremities: extremities normal, atraumatic, no cyanosis or edema, no edema        Labs:         Lab Results   Component Value Date/Time     WBC 12.2 10/18/2017 05:03 AM     WBC 9.1 10/13/2017 09:47 AM     WBC 7.9 2016 03:55 PM     HGB 11.4 10/18/2017 05:03 AM     HGB 13.7 10/13/2017 09:47 AM     HGB 12.6 2016 03:55 PM     HCT 35.3 10/18/2017 05:03 AM     HCT 42.4 10/13/2017 09:47 AM     HCT 38.9 2016 03:55 PM     PLATELET 551  05:03 AM     PLATELET 444  09:47 AM     PLATELET 844  03:55 PM          Recent Results          Recent Results (from the past 24 hour(s))   GLUCOSE, POC     Collection Time: 10/17/17  1:15 PM   Result Value Ref Range     Glucose (POC) 164 (H) 65 - 100 mg/dL   Performed by Huma Baer     GLUCOSE, POC     Collection Time: 10/17/17  4:42 PM   Result Value Ref Range     Glucose (POC) 166 (H) 65 - 100 mg/dL     Performed by Curtis Ibanez PCT     GLUCOSE, POC     Collection Time: 10/17/17 10:27 PM   Result Value Ref Range     Glucose (POC) 235 (H) 65 - 100 mg/dL     Performed by Geremias Guerrier     CBC WITH AUTOMATED DIFF     Collection Time: 10/18/17  5:03 AM   Result Value Ref Range     WBC 12.2 (H) 3.6 - 11.0 K/uL     RBC 4.52 3.80 - 5.20 M/uL     HGB 11.4 (L) 11.5 - 16.0 g/dL     HCT 35.3 35.0 - 47.0 %     MCV 78.1 (L) 80.0 - 99.0 FL     MCH 25.2 (L) 26.0 - 34.0 PG     MCHC 32.3 30.0 - 36.5 g/dL     RDW 20.6 (H) 11.5 - 14.5 %     PLATELET 048 (H) 733 - 400 K/uL     NEUTROPHILS 78 (H) 32 - 75 %     LYMPHOCYTES 17 12 - 49 %     MONOCYTES 5 5 - 13 %     EOSINOPHILS 0 0 - 7 %     BASOPHILS 0 0 - 1 %     ABS. NEUTROPHILS 9.5 (H) 1.8 - 8.0 K/UL     ABS. LYMPHOCYTES 2.1 0.8 - 3.5 K/UL     ABS. MONOCYTES 0.6 0.0 - 1.0 K/UL     ABS. EOSINOPHILS 0.0 0.0 - 0.4 K/UL     ABS. BASOPHILS 0.0 0.0 - 0.1 K/UL     DF SMEAR SCANNED        RBC COMMENTS ANISOCYTOSIS  1+        RBC COMMENTS MICROCYTOSIS  1+      METABOLIC PANEL, COMPREHENSIVE     Collection Time: 10/18/17  5:03 AM   Result Value Ref Range     Sodium 135 (L) 136 - 145 mmol/L     Potassium 3.7 3.5 - 5.1 mmol/L     Chloride 103 97 - 108 mmol/L     CO2 25 21 - 32 mmol/L     Anion gap 7 5 - 15 mmol/L     Glucose 252 (H) 65 - 100 mg/dL     BUN 7 6 - 20 MG/DL     Creatinine 0.72 0.55 - 1.02 MG/DL     BUN/Creatinine ratio 10 (L) 12 - 20       GFR est AA >60 >60 ml/min/1.73m2     GFR est non-AA >60 >60 ml/min/1.73m2     Calcium 8.6 8.5 - 10.1 MG/DL     Bilirubin, total 0.3 0.2 - 1.0 MG/DL     ALT (SGPT) 24 12 - 78 U/L     AST (SGOT) 11 (L) 15 - 37 U/L     Alk.  phosphatase 127 (H) 45 - 117 U/L     Protein, total 6.0 (L) 6.4 - 8.2 g/dL     Albumin 2.7 (L) 3.5 - 5.0 g/dL     Globulin 3.3 2.0 - 4.0 g/dL     A-G Ratio 0.8 (L) 1.1 - 2. 2     GLUCOSE, POC     Collection Time: 10/18/17  8:02 AM   Result Value Ref Range     Glucose (POC) 269 (H) 65 - 100 mg/dL     Performed by Drake Styles     GLUCOSE, POC     Collection Time: 10/18/17 11:48 AM   Result Value Ref Range     Glucose (POC) 275 (H) 65 - 100 mg/dL     Performed by Les Bentley              Assesment: POD#1  Genette Hale laparoscopic total   hysterectomy with bilateral salpingectomy  Doing well  Plan: Discharge home today with: Medications: medications prior to admission Follow up: 2 weeks Diet: as tolerated Activity: no heavy lifting

## 2017-12-04 ENCOUNTER — HOSPITAL ENCOUNTER (OUTPATIENT)
Dept: MAMMOGRAPHY | Age: 47
Discharge: HOME | End: 2017-12-04
Attending: SPECIALIST
Payer: COMMERCIAL

## 2017-12-04 DIAGNOSIS — Z12.31 VISIT FOR SCREENING MAMMOGRAM: ICD-10-CM

## 2017-12-04 PROCEDURE — 77067 SCR MAMMO BI INCL CAD: CPT

## 2022-03-19 PROBLEM — Z90.710 S/P HYSTERECTOMY: Status: ACTIVE | Noted: 2017-10-17

## 2022-08-22 ENCOUNTER — APPOINTMENT (OUTPATIENT)
Dept: GENERAL RADIOLOGY | Age: 52
End: 2022-08-22
Attending: EMERGENCY MEDICINE
Payer: COMMERCIAL

## 2022-08-22 ENCOUNTER — HOSPITAL ENCOUNTER (EMERGENCY)
Age: 52
Discharge: HOME | End: 2022-08-22
Attending: EMERGENCY MEDICINE
Payer: COMMERCIAL

## 2022-08-22 VITALS
HEIGHT: 61 IN | HEART RATE: 69 BPM | SYSTOLIC BLOOD PRESSURE: 161 MMHG | BODY MASS INDEX: 31.72 KG/M2 | TEMPERATURE: 49.1 F | DIASTOLIC BLOOD PRESSURE: 81 MMHG | WEIGHT: 167.99 LBS | OXYGEN SATURATION: 99 % | RESPIRATION RATE: 16 BRPM

## 2022-08-22 DIAGNOSIS — J06.9 VIRAL URI: ICD-10-CM

## 2022-08-22 DIAGNOSIS — M79.642 LEFT HAND PAIN: Primary | ICD-10-CM

## 2022-08-22 PROCEDURE — 99283 EMERGENCY DEPT VISIT LOW MDM: CPT

## 2022-08-22 PROCEDURE — 73130 X-RAY EXAM OF HAND: CPT

## 2022-08-22 RX ORDER — FLUTICASONE PROPIONATE 50 MCG
2 SPRAY, SUSPENSION (ML) NASAL DAILY
Qty: 1 EACH | Refills: 0 | Status: SHIPPED | OUTPATIENT
Start: 2022-08-22

## 2022-08-22 NOTE — ED PROVIDER NOTES
Date of Service:  8/22/2022    Patient:  Catrachito Oliveros    Chief Complaint:  No chief complaint on file. HPI:  Catrachito Oliveros is a 47 y.o.  female who presents for evaluation of left hand pain x5 days. Patient states symptoms have worsened. Patient denies any known trauma or injury. Patient states that she was lifting heavy furniture at work last week and the pain started afterwards. Patient complains of pain to the fourth and fifth digits on the palmar aspect. Patient denies numbness, tingling, or weakness to left hand. Patient denies swelling. Patient denies fever, chills, body aches. Patient denies chest pain, shortness of breath, abdominal pain. Prior to patient's discharge. Patient requested a nasal spray for nasal congestion. Patient denies sore throat, rhinorrhea, or ear pain. Patient's temperature rechecked due to entering error by her nurse.   Patient's temperature is 97.1 °F             Past Medical History:   Diagnosis Date    Arthritis     Chronic headaches     Diabetes (HCC)     GERD (gastroesophageal reflux disease)     Hypercholesterolemia     PUD (peptic ulcer disease)        Past Surgical History:   Procedure Laterality Date    BREAST SURGERY PROCEDURE UNLISTED      right breast biopsy    HX BREAST BIOPSY Right 10+ years ago    neg         Family History:   Problem Relation Age of Onset    Diabetes Paternal Grandmother     No Known Problems Mother     No Known Problems Father     Anesth Problems Neg Hx        Social History     Socioeconomic History    Marital status:      Spouse name: Not on file    Number of children: Not on file    Years of education: Not on file    Highest education level: Not on file   Occupational History    Not on file   Tobacco Use    Smoking status: Never    Smokeless tobacco: Never   Substance and Sexual Activity    Alcohol use: No     Alcohol/week: 0.0 standard drinks    Drug use: No    Sexual activity: Not on file   Other Topics Concern    Not on file Social History Narrative    Not on file     Social Determinants of Health     Financial Resource Strain: Not on file   Food Insecurity: Not on file   Transportation Needs: Not on file   Physical Activity: Not on file   Stress: Not on file   Social Connections: Not on file   Intimate Partner Violence: Not on file   Housing Stability: Not on file         ALLERGIES: Patient has no known allergies. Review of Systems   Constitutional:  Negative for chills and fever. HENT:  Positive for congestion. Negative for rhinorrhea and sore throat. Respiratory:  Negative for shortness of breath. Cardiovascular:  Negative for chest pain. Gastrointestinal:  Negative for abdominal pain. Genitourinary:  Negative for dysuria. Musculoskeletal:         Left hand pain. Skin:  Negative for rash. Allergic/Immunologic: Negative for immunocompromised state. Neurological:  Negative for headaches. Psychiatric/Behavioral:  Negative for agitation. All other systems reviewed and are negative. Vitals:    08/22/22 1629   BP: (!) 161/81   Pulse: 69   Resp: 16   Temp: (!) 49.1 °F (9.5 °C)   SpO2: 99%   Weight: 76.2 kg (167 lb 15.9 oz)   Height: 5' 1\" (1.549 m)            Physical Exam  Vitals and nursing note reviewed. Constitutional:       General: She is not in acute distress. HENT:      Right Ear: Tympanic membrane, ear canal and external ear normal.      Left Ear: Tympanic membrane, ear canal and external ear normal.      Nose: Congestion present. No rhinorrhea. Cardiovascular:      Rate and Rhythm: Normal rate and regular rhythm. Heart sounds: No murmur heard. Pulmonary:      Effort: Pulmonary effort is normal.      Breath sounds: Normal breath sounds. Abdominal:      Palpations: Abdomen is soft. Tenderness: There is no abdominal tenderness. Musculoskeletal:         General: Tenderness present. No swelling, deformity or signs of injury. Normal range of motion.       Cervical back: Normal range of motion. Comments: Tenderness to third and fourth digit of left hand of the palmar aspect. Neurovascular intact. No swelling noted. Skin:     General: Skin is warm. Neurological:      Mental Status: She is alert and oriented to person, place, and time. Mental status is at baseline. MDM         Procedures      VITAL SIGNS:  No data found. LABS:  No results found for this or any previous visit (from the past 6 hour(s)). IMAGING:  XR HAND LT MIN 3 V   Final Result      No fracture. Medications During Visit:  Medications - No data to display      DECISION MAKING:  Cecelia La is a 47 y.o. female who comes in as above. X-rays of the left hand showed no acute fracture or dislocation. Results discussed with patient. Prior to discharge patient requested a nasal spray  for congestion. Patient stated that she has been having congestion over the past few days. Patient denies any other symptoms. Flonase prescribed for patient's nasal congestion. Patient stable upon discharge. Return precautions given to patient. IMPRESSION:  1. Left hand pain    2. Viral URI        DISPOSITION:  Discharged      Discharge Medication List as of 8/22/2022  5:38 PM        START taking these medications    Details   fluticasone propionate (FLONASE) 50 mcg/actuation nasal spray 2 Sprays by Both Nostrils route daily. , Print, Disp-1 Each, R-0           CONTINUE these medications which have NOT CHANGED    Details   oxyCODONE-acetaminophen (PERCOCET 10)  mg per tablet Take 1-2 Tabs by mouth every four (4) hours as needed (pain). Max Daily Amount: 12 Tabs., Print, Disp-30 Tab, R-0      INSULIN DEGLUDEC (TRESIBA FLEXTOUCH U-100 SC) 64 Units by SubCUTAneous route. Every other night., Historical Med      esomeprazole (NEXIUM) 40 mg capsule Take  by mouth as needed., Historical Med      metFORMIN (GLUCOPHAGE) 1,000 mg tablet Take 1 Tab by mouth two (2) times daily (with meals). , Normal, Disp-60 Tab, R-5 atorvastatin (LIPITOR) 10 mg tablet Take 1 Tab by mouth daily. , Normal, Disp-30 Tab, R-5              Follow-up Information       Follow up With Specialties Details Why Contact Info    [facility]  Schedule an appointment as soon as possible for a visit   [address] [place], [place] [id number] [phone]    [facility]GENC[facility]PT Emergency Medicine  If symptoms worsen [address]    Tiffanie Lilly MD Obstetrics & Gynecology, Gynecology, Obstetrics Schedule an appointment as soon as possible for a visit   [address]  [address] [phone]                The patient is asked to follow-up with their primary care provider in the next several days. They are to call tomorrow for an appointment. The patient is asked to return promptly for any increased concerns or worsening of symptoms. They can return to this emergency department or any other emergency department.

## 2022-08-22 NOTE — Clinical Note
[address]  [facility] EMERGENCY DEPT  [address] [phone]  [phone]    Work/School Note    Date: 8/22/2022    To Whom It May concern:    Katja Keyes was seen and treated today in the emergency room by the following provider(s):  Attending Provider: Louie Gitelman, MD  Physician Assistant: ESTELA Friedman.      Katja Keyes is excused from work/school on 08/22/22 and 08/23/22. She is medically clear to return to work/school on 8/24/2022.        Sincerely,          ESTELA Kulkarni

## 2023-05-24 RX ORDER — ATORVASTATIN CALCIUM 10 MG/1
10 TABLET, FILM COATED ORAL DAILY
COMMUNITY
Start: 2016-02-26

## 2023-05-24 RX ORDER — FLUTICASONE PROPIONATE 50 MCG
2 SPRAY, SUSPENSION (ML) NASAL DAILY
COMMUNITY
Start: 2022-08-22

## 2023-05-24 RX ORDER — ESOMEPRAZOLE MAGNESIUM 40 MG/1
CAPSULE, DELAYED RELEASE ORAL PRN
COMMUNITY

## 2023-05-24 RX ORDER — OXYCODONE AND ACETAMINOPHEN 10; 325 MG/1; MG/1
1-2 TABLET ORAL EVERY 4 HOURS PRN
COMMUNITY
Start: 2017-10-18

## 2024-02-22 ENCOUNTER — HOSPITAL ENCOUNTER (EMERGENCY)
Facility: HOSPITAL | Age: 54
Discharge: HOME | End: 2024-02-22
Attending: EMERGENCY MEDICINE
Payer: COMMERCIAL

## 2024-02-22 VITALS
TEMPERATURE: 97.9 F | RESPIRATION RATE: 16 BRPM | HEART RATE: 72 BPM | WEIGHT: 150 LBS | OXYGEN SATURATION: 98 % | DIASTOLIC BLOOD PRESSURE: 67 MMHG | SYSTOLIC BLOOD PRESSURE: 147 MMHG | BODY MASS INDEX: 26.58 KG/M2 | HEIGHT: 63 IN

## 2024-02-22 DIAGNOSIS — J06.9 ACUTE UPPER RESPIRATORY INFECTION: Primary | ICD-10-CM

## 2024-02-22 PROCEDURE — 99283 EMERGENCY DEPT VISIT LOW MDM: CPT

## 2024-02-22 RX ORDER — ALBUTEROL SULFATE 90 UG/1
2 AEROSOL, METERED RESPIRATORY (INHALATION) 4 TIMES DAILY PRN
Qty: 18 G | Refills: 0 | Status: SHIPPED | OUTPATIENT
Start: 2024-02-22

## 2024-02-22 RX ORDER — AZITHROMYCIN 250 MG/1
TABLET, FILM COATED ORAL
Qty: 6 TABLET | Refills: 0 | Status: SHIPPED | OUTPATIENT
Start: 2024-02-22 | End: 2024-03-03

## 2024-02-22 ASSESSMENT — LIFESTYLE VARIABLES
HOW MANY STANDARD DRINKS CONTAINING ALCOHOL DO YOU HAVE ON A TYPICAL DAY: PATIENT DOES NOT DRINK
HOW OFTEN DO YOU HAVE A DRINK CONTAINING ALCOHOL: NEVER

## 2024-02-22 ASSESSMENT — PAIN SCALES - GENERAL: PAINLEVEL_OUTOF10: 3

## 2024-02-22 ASSESSMENT — PAIN DESCRIPTION - LOCATION: LOCATION: THROAT

## 2024-02-22 ASSESSMENT — PAIN - FUNCTIONAL ASSESSMENT: PAIN_FUNCTIONAL_ASSESSMENT: 0-10

## 2024-02-22 NOTE — DISCHARGE INSTRUCTIONS
You are being discharged from the ED to return to your home or facility of residence. You should rest as much as possible unless otherwise indicated.   Take any medications you have been prescribed by me per their instructions and labels. Follow any instructions on preprinted discharge instructions that you have been given for the various conditions we have diagnosed or treating you for.  Call your primary care providers or any other doctors who care for you or that you have been referred to and make arrangements for a timely follow up visit from the Emergency Department  If you have been given referrals to specialists or other MDs their names, addresses, and phone numbers appear on your discharge paperwork. You should contact them as soon as possible to make follow up arrangements,  If you do not have a PCP if not you can follow up with one of the following:        Please return promptly to the closest ER if your symptoms persist / worsen or if develop new symptoms that concern you!

## 2024-02-22 NOTE — ED PROVIDER NOTES
- No data to display    All other labs were within normal range or not returned as of this dictation.    EMERGENCY DEPARTMENT COURSE and DIFFERENTIAL DIAGNOSIS/MDM:   Vitals:    Vitals:    02/22/24 0850   BP: (!) 147/67   Pulse: 72   Resp: 16   Temp: 97.9 °F (36.6 °C)   TempSrc: Oral   SpO2: 98%   Weight: 68 kg (150 lb)   Height: 1.6 m (5' 3\")           Medical Decision Making      SEE HPI,                 REASSESSMENT            CONSULTS:  None    PROCEDURES:  Unless otherwise noted below, none     Procedures          (Please note that portions of this note were completed with a voice recognition program.  Efforts were made to edit the dictations but occasionally words are mis-transcribed.)    ANDRES Tavarez CNP (electronically signed)  Emergency Attending Physician / Physician Assistant / Nurse Practitioner             Andrey Guerra APRN - CNP  02/22/24 0914

## 2024-02-22 NOTE — ED TRIAGE NOTES
Pt to ER with sore throat, headache and body aches that she reports has been going on 8 days. Pt reports she has been taking Tylenol with some relief with this, last dose last night. No other sick contacts.     Afebrile today.

## 2024-04-05 ENCOUNTER — HOSPITAL ENCOUNTER (EMERGENCY)
Facility: HOSPITAL | Age: 54
Discharge: HOME | End: 2024-04-05
Attending: STUDENT IN AN ORGANIZED HEALTH CARE EDUCATION/TRAINING PROGRAM
Payer: COMMERCIAL

## 2024-04-05 VITALS
DIASTOLIC BLOOD PRESSURE: 80 MMHG | RESPIRATION RATE: 18 BRPM | SYSTOLIC BLOOD PRESSURE: 145 MMHG | TEMPERATURE: 98.2 F | HEART RATE: 83 BPM | OXYGEN SATURATION: 95 %

## 2024-04-05 DIAGNOSIS — B35.4 TINEA CORPORIS: ICD-10-CM

## 2024-04-05 DIAGNOSIS — R73.9 HYPERGLYCEMIA: Primary | ICD-10-CM

## 2024-04-05 DIAGNOSIS — J02.9 VIRAL PHARYNGITIS: ICD-10-CM

## 2024-04-05 LAB
ANION GAP SERPL CALC-SCNC: 14 MMOL/L (ref 5–15)
BASE DEFICIT BLDV-SCNC: 1.7 MMOL/L
BASOPHILS # BLD: 0.1 K/UL (ref 0–1)
BASOPHILS NFR BLD: 1 % (ref 0–1)
BUN SERPL-MCNC: 25 MG/DL (ref 6–20)
BUN/CREAT SERPL: 26 (ref 12–20)
CALCIUM SERPL-MCNC: 9.6 MG/DL (ref 8.6–10)
CHLORIDE SERPL-SCNC: 97 MMOL/L (ref 98–107)
CO2 SERPL-SCNC: 22 MMOL/L (ref 22–29)
CREAT SERPL-MCNC: 0.96 MG/DL (ref 0.5–0.9)
DEPRECATED S PYO AG THROAT QL EIA: NEGATIVE
DIFFERENTIAL METHOD BLD: ABNORMAL
EOSINOPHIL # BLD: 0.5 K/UL (ref 0–0.4)
EOSINOPHIL NFR BLD: 6 %
ERYTHROCYTE [DISTWIDTH] IN BLOOD BY AUTOMATED COUNT: 12.8 % (ref 11.5–14.5)
GLUCOSE BLD STRIP.AUTO-MCNC: 341 MG/DL (ref 65–117)
GLUCOSE BLD STRIP.AUTO-MCNC: 378 MG/DL (ref 65–117)
GLUCOSE BLD STRIP.AUTO-MCNC: 432 MG/DL (ref 65–117)
GLUCOSE BLD STRIP.AUTO-MCNC: 474 MG/DL (ref 65–117)
GLUCOSE SERPL-MCNC: 487 MG/DL (ref 65–100)
HCO3 BLDV-SCNC: 23 MMOL/L (ref 23–28)
HCT VFR BLD AUTO: 38.1 % (ref 35–47)
HGB BLD-MCNC: 13 G/DL (ref 11.5–16)
IMM GRANULOCYTES # BLD AUTO: 0 K/UL (ref 0–0.04)
IMM GRANULOCYTES NFR BLD AUTO: 0 % (ref 0–0.5)
LYMPHOCYTES # BLD: 2.9 K/UL (ref 0.8–3.5)
LYMPHOCYTES NFR BLD: 34 % (ref 12–49)
MCH RBC QN AUTO: 28.3 PG (ref 26–34)
MCHC RBC AUTO-ENTMCNC: 34.1 G/DL (ref 30–36.5)
MCV RBC AUTO: 83 FL (ref 80–99)
MONOCYTES # BLD: 0.5 K/UL (ref 0–1)
MONOCYTES NFR BLD: 6 % (ref 5–13)
NEUTS SEG # BLD: 4.6 K/UL (ref 1.8–8)
NEUTS SEG NFR BLD: 53 % (ref 32–75)
NRBC # BLD: 0 K/UL (ref 0–0.01)
NRBC BLD-RTO: 0 PER 100 WBC
PCO2 BLDV: 38 MMHG (ref 41–51)
PH BLDV: 7.39 (ref 7.32–7.42)
PLATELET # BLD AUTO: 428 K/UL (ref 150–400)
PMV BLD AUTO: 10.2 FL (ref 8.9–12.9)
PO2 BLDV: 108 MMHG (ref 25–40)
POTASSIUM SERPL-SCNC: 4.6 MMOL/L (ref 3.5–5.1)
RBC # BLD AUTO: 4.59 M/UL (ref 3.8–5.2)
SAO2 % BLDV: 98.2 % (ref 65–88)
SERVICE CMNT-IMP: ABNORMAL
SODIUM SERPL-SCNC: 133 MMOL/L (ref 136–145)
SPECIMEN TYPE: ABNORMAL
WBC # BLD AUTO: 8.5 K/UL (ref 3.6–11)

## 2024-04-05 PROCEDURE — 82803 BLOOD GASES ANY COMBINATION: CPT

## 2024-04-05 PROCEDURE — 96361 HYDRATE IV INFUSION ADD-ON: CPT

## 2024-04-05 PROCEDURE — 36415 COLL VENOUS BLD VENIPUNCTURE: CPT

## 2024-04-05 PROCEDURE — 87880 STREP A ASSAY W/OPTIC: CPT

## 2024-04-05 PROCEDURE — 87070 CULTURE OTHR SPECIMN AEROBIC: CPT

## 2024-04-05 PROCEDURE — 80048 BASIC METABOLIC PNL TOTAL CA: CPT

## 2024-04-05 PROCEDURE — 82962 GLUCOSE BLOOD TEST: CPT

## 2024-04-05 PROCEDURE — 2580000003 HC RX 258: Performed by: STUDENT IN AN ORGANIZED HEALTH CARE EDUCATION/TRAINING PROGRAM

## 2024-04-05 PROCEDURE — 6370000000 HC RX 637 (ALT 250 FOR IP): Performed by: STUDENT IN AN ORGANIZED HEALTH CARE EDUCATION/TRAINING PROGRAM

## 2024-04-05 PROCEDURE — 99284 EMERGENCY DEPT VISIT MOD MDM: CPT

## 2024-04-05 PROCEDURE — 85025 COMPLETE CBC W/AUTO DIFF WBC: CPT

## 2024-04-05 RX ORDER — LIDOCAINE HYDROCHLORIDE 20 MG/ML
15 SOLUTION OROPHARYNGEAL
Status: COMPLETED | OUTPATIENT
Start: 2024-04-05 | End: 2024-04-05

## 2024-04-05 RX ORDER — TIRZEPATIDE 5 MG/.5ML
5 INJECTION, SOLUTION SUBCUTANEOUS WEEKLY
Qty: 2 ML | Refills: 0 | Status: SHIPPED | OUTPATIENT
Start: 2024-04-05 | End: 2024-05-05

## 2024-04-05 RX ORDER — SODIUM CHLORIDE, SODIUM LACTATE, POTASSIUM CHLORIDE, AND CALCIUM CHLORIDE .6; .31; .03; .02 G/100ML; G/100ML; G/100ML; G/100ML
1000 INJECTION, SOLUTION INTRAVENOUS ONCE
Status: COMPLETED | OUTPATIENT
Start: 2024-04-05 | End: 2024-04-05

## 2024-04-05 RX ORDER — TIRZEPATIDE 5 MG/.5ML
5 INJECTION, SOLUTION SUBCUTANEOUS WEEKLY
COMMUNITY
End: 2024-04-05

## 2024-04-05 RX ORDER — CLOTRIMAZOLE AND BETAMETHASONE DIPROPIONATE 10; .64 MG/G; MG/G
CREAM TOPICAL
Qty: 15 G | Refills: 0 | Status: SHIPPED | OUTPATIENT
Start: 2024-04-05

## 2024-04-05 RX ORDER — INSULIN DEGLUDEC 200 U/ML
200 INJECTION, SOLUTION SUBCUTANEOUS DAILY
COMMUNITY
End: 2024-04-05

## 2024-04-05 RX ORDER — INSULIN DEGLUDEC 200 U/ML
200 INJECTION, SOLUTION SUBCUTANEOUS DAILY
Qty: 30 ADJUSTABLE DOSE PRE-FILLED PEN SYRINGE | Refills: 0 | Status: SHIPPED | OUTPATIENT
Start: 2024-04-05 | End: 2024-05-05

## 2024-04-05 RX ORDER — TIRZEPATIDE 5 MG/.5ML
5 INJECTION, SOLUTION SUBCUTANEOUS WEEKLY
Qty: 2 ML | Refills: 0 | Status: SHIPPED | OUTPATIENT
Start: 2024-04-05 | End: 2024-04-05

## 2024-04-05 RX ORDER — ACETAMINOPHEN 325 MG/1
650 TABLET ORAL
Status: COMPLETED | OUTPATIENT
Start: 2024-04-05 | End: 2024-04-05

## 2024-04-05 RX ORDER — INSULIN LISPRO 100 [IU]/ML
6 INJECTION, SOLUTION INTRAVENOUS; SUBCUTANEOUS ONCE
Status: COMPLETED | OUTPATIENT
Start: 2024-04-05 | End: 2024-04-05

## 2024-04-05 RX ORDER — INSULIN DEGLUDEC 200 U/ML
200 INJECTION, SOLUTION SUBCUTANEOUS DAILY
Qty: 30 ADJUSTABLE DOSE PRE-FILLED PEN SYRINGE | Refills: 0 | Status: SHIPPED | OUTPATIENT
Start: 2024-04-05 | End: 2024-04-05

## 2024-04-05 RX ADMIN — ACETAMINOPHEN 650 MG: 325 TABLET ORAL at 22:34

## 2024-04-05 RX ADMIN — SODIUM CHLORIDE, POTASSIUM CHLORIDE, SODIUM LACTATE AND CALCIUM CHLORIDE 1000 ML: 600; 310; 30; 20 INJECTION, SOLUTION INTRAVENOUS at 22:05

## 2024-04-05 RX ADMIN — LIDOCAINE HYDROCHLORIDE 15 ML: 20 SOLUTION ORAL; TOPICAL at 22:35

## 2024-04-05 RX ADMIN — INSULIN LISPRO 6 UNITS: 100 INJECTION, SOLUTION INTRAVENOUS; SUBCUTANEOUS at 22:05

## 2024-04-05 ASSESSMENT — LIFESTYLE VARIABLES
HOW OFTEN DO YOU HAVE A DRINK CONTAINING ALCOHOL: NEVER
HOW MANY STANDARD DRINKS CONTAINING ALCOHOL DO YOU HAVE ON A TYPICAL DAY: PATIENT DOES NOT DRINK

## 2024-04-05 ASSESSMENT — PAIN - FUNCTIONAL ASSESSMENT: PAIN_FUNCTIONAL_ASSESSMENT: 0-10

## 2024-04-05 ASSESSMENT — PAIN DESCRIPTION - DESCRIPTORS: DESCRIPTORS: SORE

## 2024-04-05 ASSESSMENT — PAIN SCALES - GENERAL: PAINLEVEL_OUTOF10: 10

## 2024-04-05 ASSESSMENT — PAIN DESCRIPTION - LOCATION: LOCATION: THROAT

## 2024-04-06 NOTE — ED PROVIDER NOTES
concerning for tinea, pruritic circular scaly patch with central clearing, patient given topical antifungal for treatment.  Patient given strict return precautions, discharged in stable condition    Amount and/or Complexity of Data Reviewed  Labs: ordered. Decision-making details documented in ED Course.    Risk  OTC drugs.  Prescription drug management.                REASSESSMENT     ED Course as of 04/05/24 2313 Fri Apr 05, 2024 2136 Anion Gap: 14 [SL]   2136 CBC with Auto Differential(!):    WBC 8.5   RBC 4.59   Hemoglobin Quant 13.0   Hematocrit 38.1   MCV 83.0   MCH 28.3   MCHC 34.1   RDW 12.8   Platelet Count 428(!)   MPV 10.2   Nucleated Red Blood Cells 0.0   Nucleated Red Blood Cells 0.00   Neutrophils, Automated 53   Lymphocyte % 34   Monocytes % 6   Eosinophils % 6(!)   Basophils % 1   Immature Granulocytes % 0   Neutrophils Absolute 4.6   Lymphocytes Absolute 2.9   Monocytes Absolute 0.5   Eosinophils Absolute 0.5(!)   Basophils Absolute 0.1   Immature Granulocytes Absolute 0.0   Differential Type AUTOMATED [SL]   2136 Rapid Strep Screen:    Strep A Ag Negative [SL]   2200 PH, VENOUS (POC): 7.39 [SL]   2200 Patient with hyper glycemia, no DKA.  Potassium normal 4.6 [SL]   2310 POCT Glucose(!):    POC Glucose 341(!)   Performed by: WYATT TATE [SL]   2310 Repeat glucose improved after fluids and lispro [SL]      ED Course User Index  [SL] Kristin Stacy MD           CONSULTS:  None    PROCEDURES:  Unless otherwise noted below, none     Procedures          FINAL IMPRESSION      1. Hyperglycemia    2. Tinea corporis    3. Viral pharyngitis            DISPOSITION/PLAN   DISPOSITION Decision To Discharge 04/05/2024 10:58:43 PM      PATIENT REFERRED TO:  No follow-up provider specified.    DISCHARGE MEDICATIONS:  New Prescriptions    CLOTRIMAZOLE-BETAMETHASONE (LOTRISONE) 1-0.05 % CREAM    Apply topically 2 times daily.         (Please note that portions of this note were completed with a voice

## 2024-04-06 NOTE — ED TRIAGE NOTES
[facility]  States she has been off her insulin since Decemeber when she had to change doctors because of insurance.  States she was told to come to ER because her potassium and blood sugar levels were high

## 2024-04-06 NOTE — ED NOTES
Pt discharged to home in [place], states understanding of dc instructions and followup, skin care, sore throat care, rx x 2 sent

## 2024-04-06 NOTE — DISCHARGE INSTRUCTIONS
You are seen today for evaluation of elevated blood sugar, sore throat and rash.  You have been given refills of your diabetes medications, please follow-up closely with your primary care doctor as soon as possible for reevaluation and discussion of management.  Your sore throat is likely due to a viral upper respiratory infection, please take Tylenol and Motrin as needed for pain, over-the-counter lozenges for symptomatic treatment.  You are also diagnosed with ringworm or tinea corporis, you have been given an medication called Lotrisone for treatment please use as instructed and follow-up with your primary care doctor.  Please return to the emergency department if you are having chest pain, difficulty breathing, persistent the elevated blood sugars unresponsive to medication at home, vomiting or any other new or concerning symptoms    Thank you for letting us take care of you, hope you feel better soon,  Kristin Stacy MD

## 2024-04-07 LAB
BACTERIA SPEC CULT: NORMAL
SERVICE CMNT-IMP: NORMAL

## 2024-04-08 LAB
BACTERIA SPEC CULT: NORMAL
SERVICE CMNT-IMP: NORMAL

## 2024-05-08 ENCOUNTER — HOSPITAL ENCOUNTER (OUTPATIENT)
Facility: HOSPITAL | Age: 54
Setting detail: SPECIMEN
Discharge: HOME | End: 2024-05-11

## 2024-05-08 PROCEDURE — 82306 VITAMIN D 25 HYDROXY: CPT

## 2024-05-08 PROCEDURE — 80061 LIPID PANEL: CPT

## 2024-05-08 PROCEDURE — 36415 COLL VENOUS BLD VENIPUNCTURE: CPT

## 2024-05-08 PROCEDURE — 80053 COMPREHEN METABOLIC PANEL: CPT

## 2024-05-08 PROCEDURE — 83036 HEMOGLOBIN GLYCOSYLATED A1C: CPT

## 2024-05-08 PROCEDURE — 85025 COMPLETE CBC W/AUTO DIFF WBC: CPT

## 2024-05-08 PROCEDURE — 84443 ASSAY THYROID STIM HORMONE: CPT

## 2024-05-09 LAB
25(OH)D3 SERPL-MCNC: 20.6 NG/ML (ref 30–100)
ALBUMIN SERPL-MCNC: 3.6 G/DL (ref 3.5–5)
ALBUMIN/GLOB SERPL: 1 (ref 1.1–2.2)
ALP SERPL-CCNC: 207 U/L (ref 45–117)
ALT SERPL-CCNC: 30 U/L (ref 12–78)
ANION GAP SERPL CALC-SCNC: 4 MMOL/L (ref 5–15)
AST SERPL-CCNC: 11 U/L (ref 15–37)
BASOPHILS # BLD: 0.1 K/UL (ref 0–0.1)
BASOPHILS NFR BLD: 1 % (ref 0–1)
BILIRUB SERPL-MCNC: 0.3 MG/DL (ref 0.2–1)
BUN SERPL-MCNC: 21 MG/DL (ref 6–20)
BUN/CREAT SERPL: 20 (ref 12–20)
CALCIUM SERPL-MCNC: 9.7 MG/DL (ref 8.5–10.1)
CHLORIDE SERPL-SCNC: 104 MMOL/L (ref 97–108)
CHOLEST SERPL-MCNC: 207 MG/DL
CO2 SERPL-SCNC: 27 MMOL/L (ref 21–32)
CREAT SERPL-MCNC: 1.04 MG/DL (ref 0.55–1.02)
DIFFERENTIAL METHOD BLD: ABNORMAL
EOSINOPHIL # BLD: 0.2 K/UL (ref 0–0.4)
EOSINOPHIL NFR BLD: 2 % (ref 0–7)
ERYTHROCYTE [DISTWIDTH] IN BLOOD BY AUTOMATED COUNT: 12.9 % (ref 11.5–14.5)
EST. AVERAGE GLUCOSE BLD GHB EST-MCNC: 309 MG/DL
GLOBULIN SER CALC-MCNC: 3.6 G/DL (ref 2–4)
GLUCOSE SERPL-MCNC: 403 MG/DL (ref 65–100)
HBA1C MFR BLD: 12.4 % (ref 4–5.6)
HCT VFR BLD AUTO: 40.7 % (ref 35–47)
HDLC SERPL-MCNC: 56 MG/DL
HDLC SERPL: 3.7 (ref 0–5)
HGB BLD-MCNC: 12.8 G/DL (ref 11.5–16)
IMM GRANULOCYTES # BLD AUTO: 0 K/UL (ref 0–0.04)
IMM GRANULOCYTES NFR BLD AUTO: 0 % (ref 0–0.5)
LDLC SERPL CALC-MCNC: 114.2 MG/DL (ref 0–100)
LYMPHOCYTES # BLD: 2.7 K/UL (ref 0.8–3.5)
LYMPHOCYTES NFR BLD: 38 % (ref 12–49)
MCH RBC QN AUTO: 27.8 PG (ref 26–34)
MCHC RBC AUTO-ENTMCNC: 31.4 G/DL (ref 30–36.5)
MCV RBC AUTO: 88.3 FL (ref 80–99)
MONOCYTES # BLD: 0.4 K/UL (ref 0–1)
MONOCYTES NFR BLD: 5 % (ref 5–13)
NEUTS SEG # BLD: 3.8 K/UL (ref 1.8–8)
NRBC # BLD: 0 K/UL (ref 0–0.01)
NRBC BLD-RTO: 0 PER 100 WBC
PLATELET # BLD AUTO: 447 K/UL (ref 150–400)
PMV BLD AUTO: 11.6 FL (ref 8.9–12.9)
POTASSIUM SERPL-SCNC: 4.1 MMOL/L (ref 3.5–5.1)
PROT SERPL-MCNC: 7.2 G/DL (ref 6.4–8.2)
RBC # BLD AUTO: 4.61 M/UL (ref 3.8–5.2)
SODIUM SERPL-SCNC: 135 MMOL/L (ref 136–145)
TRIGL SERPL-MCNC: 184 MG/DL
TSH SERPL DL<=0.05 MIU/L-ACNC: 1.31 UIU/ML (ref 0.36–3.74)
VLDLC SERPL CALC-MCNC: 36.8 MG/DL
WBC # BLD AUTO: 7.1 K/UL (ref 3.6–11)

## 2024-06-17 ENCOUNTER — HOSPITAL ENCOUNTER (EMERGENCY)
Facility: HOSPITAL | Age: 54
Discharge: HOME | End: 2024-06-17
Attending: EMERGENCY MEDICINE
Payer: COMMERCIAL

## 2024-06-17 ENCOUNTER — APPOINTMENT (OUTPATIENT)
Facility: HOSPITAL | Age: 54
End: 2024-06-17
Payer: COMMERCIAL

## 2024-06-17 VITALS
HEIGHT: 63 IN | DIASTOLIC BLOOD PRESSURE: 92 MMHG | BODY MASS INDEX: 26.56 KG/M2 | RESPIRATION RATE: 14 BRPM | HEART RATE: 69 BPM | WEIGHT: 149.91 LBS | TEMPERATURE: 98.2 F | SYSTOLIC BLOOD PRESSURE: 142 MMHG | OXYGEN SATURATION: 98 %

## 2024-06-17 DIAGNOSIS — R73.9 HYPERGLYCEMIA: ICD-10-CM

## 2024-06-17 DIAGNOSIS — R10.33 PERIUMBILICAL ABDOMINAL PAIN: ICD-10-CM

## 2024-06-17 DIAGNOSIS — K59.00 CONSTIPATION, UNSPECIFIED CONSTIPATION TYPE: Primary | ICD-10-CM

## 2024-06-17 LAB
ALBUMIN SERPL-MCNC: 4.1 G/DL (ref 3.5–5.2)
ALBUMIN/GLOB SERPL: 1.3 (ref 1.1–2.2)
ALP SERPL-CCNC: 216 U/L (ref 35–104)
ALT SERPL-CCNC: 23 U/L (ref 10–35)
ANION GAP SERPL CALC-SCNC: 11 MMOL/L (ref 5–15)
APPEARANCE UR: CLEAR
AST SERPL-CCNC: 17 U/L (ref 10–35)
BACTERIA URNS QL MICRO: NEGATIVE /HPF
BASOPHILS # BLD: 0.1 K/UL (ref 0–1)
BASOPHILS NFR BLD: 1 % (ref 0–1)
BILIRUB SERPL-MCNC: <0.2 MG/DL (ref 0.2–1)
BILIRUB UR QL: NEGATIVE
BUN SERPL-MCNC: 20 MG/DL (ref 6–20)
BUN/CREAT SERPL: 24 (ref 12–20)
CALCIUM SERPL-MCNC: 9.3 MG/DL (ref 8.6–10)
CHLORIDE SERPL-SCNC: 97 MMOL/L (ref 98–107)
CO2 SERPL-SCNC: 24 MMOL/L (ref 22–29)
COLOR UR: ABNORMAL
CREAT SERPL-MCNC: 0.82 MG/DL (ref 0.5–0.9)
DIFFERENTIAL METHOD BLD: ABNORMAL
EOSINOPHIL # BLD: 0.2 K/UL (ref 0–0.4)
EOSINOPHIL NFR BLD: 2 %
EPITH CASTS URNS QL MICRO: ABNORMAL /LPF
ERYTHROCYTE [DISTWIDTH] IN BLOOD BY AUTOMATED COUNT: 13 % (ref 11.5–14.5)
GLOBULIN SER CALC-MCNC: 3.1 G/DL (ref 2–4)
GLUCOSE BLD STRIP.AUTO-MCNC: 173 MG/DL (ref 65–117)
GLUCOSE SERPL-MCNC: 667 MG/DL (ref 65–100)
GLUCOSE UR STRIP.AUTO-MCNC: >1000 MG/DL
HCT VFR BLD AUTO: 42.6 % (ref 35–47)
HGB BLD-MCNC: 14.4 G/DL (ref 11.5–16)
HGB UR QL STRIP: NEGATIVE
HYALINE CASTS URNS QL MICRO: ABNORMAL /LPF
IMM GRANULOCYTES # BLD AUTO: 0 K/UL (ref 0–0.04)
IMM GRANULOCYTES NFR BLD AUTO: 0 % (ref 0–0.5)
KETONES UR QL STRIP.AUTO: ABNORMAL MG/DL
LEUKOCYTE ESTERASE UR QL STRIP.AUTO: NEGATIVE
LIPASE SERPL-CCNC: 39 U/L (ref 13–60)
LYMPHOCYTES # BLD: 2.2 K/UL (ref 0.8–3.5)
LYMPHOCYTES NFR BLD: 32 % (ref 12–49)
MCH RBC QN AUTO: 28.6 PG (ref 26–34)
MCHC RBC AUTO-ENTMCNC: 33.8 G/DL (ref 30–36.5)
MCV RBC AUTO: 84.5 FL (ref 80–99)
MONOCYTES # BLD: 0.4 K/UL (ref 0–1)
MONOCYTES NFR BLD: 6 % (ref 5–13)
NEUTS SEG # BLD: 3.9 K/UL (ref 1.8–8)
NEUTS SEG NFR BLD: 59 % (ref 32–75)
NITRITE UR QL STRIP.AUTO: NEGATIVE
NRBC # BLD: 0 K/UL (ref 0–0.01)
NRBC BLD-RTO: 0 PER 100 WBC
PH UR STRIP: 5.5 (ref 5–8)
PLATELET # BLD AUTO: 414 K/UL (ref 150–400)
PMV BLD AUTO: 10.5 FL (ref 8.9–12.9)
POTASSIUM SERPL-SCNC: 4.3 MMOL/L (ref 3.5–5.1)
PROT SERPL-MCNC: 7.2 G/DL (ref 6.4–8.3)
PROT UR STRIP-MCNC: NEGATIVE MG/DL
RBC # BLD AUTO: 5.04 M/UL (ref 3.8–5.2)
RBC #/AREA URNS HPF: ABNORMAL /HPF
SERVICE CMNT-IMP: ABNORMAL
SODIUM SERPL-SCNC: 132 MMOL/L (ref 136–145)
SP GR UR REFRACTOMETRY: >1.03 (ref 1–1.03)
SPECIMEN HOLD: NORMAL
UROBILINOGEN UR QL STRIP.AUTO: 0.2 EU/DL (ref 0.2–1)
WBC # BLD AUTO: 6.7 K/UL (ref 3.6–11)
WBC URNS QL MICRO: ABNORMAL /HPF (ref 0–4)

## 2024-06-17 PROCEDURE — 80053 COMPREHEN METABOLIC PANEL: CPT

## 2024-06-17 PROCEDURE — 6370000000 HC RX 637 (ALT 250 FOR IP): Performed by: EMERGENCY MEDICINE

## 2024-06-17 PROCEDURE — 36415 COLL VENOUS BLD VENIPUNCTURE: CPT

## 2024-06-17 PROCEDURE — 6360000002 HC RX W HCPCS: Performed by: EMERGENCY MEDICINE

## 2024-06-17 PROCEDURE — 85025 COMPLETE CBC W/AUTO DIFF WBC: CPT

## 2024-06-17 PROCEDURE — 81001 URINALYSIS AUTO W/SCOPE: CPT

## 2024-06-17 PROCEDURE — 83690 ASSAY OF LIPASE: CPT

## 2024-06-17 PROCEDURE — 82962 GLUCOSE BLOOD TEST: CPT

## 2024-06-17 PROCEDURE — 96361 HYDRATE IV INFUSION ADD-ON: CPT

## 2024-06-17 PROCEDURE — 96375 TX/PRO/DX INJ NEW DRUG ADDON: CPT

## 2024-06-17 PROCEDURE — 96374 THER/PROPH/DIAG INJ IV PUSH: CPT

## 2024-06-17 PROCEDURE — 2580000003 HC RX 258: Performed by: EMERGENCY MEDICINE

## 2024-06-17 PROCEDURE — 74019 RADEX ABDOMEN 2 VIEWS: CPT

## 2024-06-17 PROCEDURE — 99284 EMERGENCY DEPT VISIT MOD MDM: CPT

## 2024-06-17 RX ORDER — ONDANSETRON 2 MG/ML
4 INJECTION INTRAMUSCULAR; INTRAVENOUS ONCE
Status: COMPLETED | OUTPATIENT
Start: 2024-06-17 | End: 2024-06-17

## 2024-06-17 RX ORDER — 0.9 % SODIUM CHLORIDE 0.9 %
1000 INTRAVENOUS SOLUTION INTRAVENOUS ONCE
Status: COMPLETED | OUTPATIENT
Start: 2024-06-17 | End: 2024-06-17

## 2024-06-17 RX ORDER — POLYETHYLENE GLYCOL 3350, SODIUM CHLORIDE, SODIUM BICARBONATE, POTASSIUM CHLORIDE 420; 11.2; 5.72; 1.48 G/4L; G/4L; G/4L; G/4L
4000 POWDER, FOR SOLUTION ORAL ONCE
Qty: 4000 ML | Refills: 0 | Status: SHIPPED | OUTPATIENT
Start: 2024-06-17 | End: 2024-06-17

## 2024-06-17 RX ADMIN — HUMAN INSULIN 10 UNITS: 100 INJECTION, SOLUTION SUBCUTANEOUS at 14:51

## 2024-06-17 RX ADMIN — SODIUM CHLORIDE 1000 ML: 9 INJECTION, SOLUTION INTRAVENOUS at 13:20

## 2024-06-17 RX ADMIN — ONDANSETRON 4 MG: 2 INJECTION INTRAMUSCULAR; INTRAVENOUS at 13:19

## 2024-06-17 ASSESSMENT — PAIN SCALES - GENERAL: PAINLEVEL_OUTOF10: 8

## 2024-06-17 NOTE — ED TRIAGE NOTES
Patient arrives to ED ambulatory w/o difficulty. No acute distress noted in triage. A&O x 4. Skin is warm, dry & intact on obs. Pt reports abdominal pain since Tuesday, burning sensation around belly button. She states she had a bowel movement yesterday but can go 5 or 6 days without a bowel movement. Reports nausea and vomiting for 3 days. Denies urinary symptoms.  Pt tried a sample of Linzess but did not take it every day.

## 2024-06-17 NOTE — ED PROVIDER NOTES
Pulmonary:      Effort: Pulmonary effort is normal.      Breath sounds: Normal breath sounds.   Abdominal:      General: There is no distension.      Tenderness: There is no abdominal tenderness.   Musculoskeletal:         General: No swelling or deformity.      Cervical back: No rigidity.   Skin:     General: Skin is warm and dry.   Neurological:      General: No focal deficit present.      Mental Status: She is alert.   Psychiatric:         Mood and Affect: Mood normal.         DIAGNOSTIC RESULTS     EKG: All EKG's are interpreted by the Emergency Department Physician who either signs or Co-signs this chart in the absence of a cardiologist.        RADIOLOGY:   Non-plain film images such as CT, Ultrasound and MRI are read by the radiologist. Plain radiographic images are visualized and preliminarily interpreted by the emergency physician with the below findings:        Interpretation per the Radiologist below, if available at the time of this note:    XR ABDOMEN (2 VIEWS)    (Results Pending)        LABS:  Labs Reviewed   COMPREHENSIVE METABOLIC PANEL - Abnormal; Notable for the following components:       Result Value    Sodium 132 (*)     Chloride 97 (*)     Glucose 667 (*)     BUN/Creatinine Ratio 24 (*)     Total Bilirubin <0.2 (*)     Alk Phosphatase 216 (*)     All other components within normal limits   CBC WITH AUTO DIFFERENTIAL - Abnormal; Notable for the following components:    Platelets 414 (*)     Eosinophils % 2 (*)     All other components within normal limits   URINALYSIS WITH MICROSCOPIC - Abnormal; Notable for the following components:    Specific Gravity, UA >1.030 (*)     Glucose, Ur >1000 (*)     Ketones, Urine TRACE (*)     All other components within normal limits   URINE CULTURE HOLD SAMPLE   LIPASE       All other labs were within normal range or not returned as of this dictation.    EMERGENCY DEPARTMENT COURSE and DIFFERENTIAL DIAGNOSIS/MDM:   Vitals:    Vitals:    06/17/24 1251   BP:

## 2025-07-02 ENCOUNTER — HOSPITAL ENCOUNTER (INPATIENT)
Facility: HOSPITAL | Age: 55
LOS: 2 days | Discharge: HOME | End: 2025-07-04
Attending: EMERGENCY MEDICINE | Admitting: STUDENT IN AN ORGANIZED HEALTH CARE EDUCATION/TRAINING PROGRAM
Payer: MEDICAID

## 2025-07-02 DIAGNOSIS — E11.10 DIABETIC KETOACIDOSIS WITHOUT COMA ASSOCIATED WITH TYPE 2 DIABETES MELLITUS (HCC): Primary | ICD-10-CM

## 2025-07-02 PROBLEM — I73.89 HHS (HYPOTHENAR HAMMER SYNDROME): Status: ACTIVE | Noted: 2025-07-02

## 2025-07-02 LAB
ALBUMIN SERPL-MCNC: 3.6 G/DL (ref 3.5–5.2)
ALBUMIN/GLOB SERPL: 1.3 (ref 1.1–2.2)
ALP SERPL-CCNC: 189 U/L (ref 35–104)
ALT SERPL-CCNC: 7 U/L (ref 10–35)
ANION GAP SERPL CALC-SCNC: 15 MMOL/L (ref 2–12)
ANION GAP SERPL CALC-SCNC: 19 MMOL/L (ref 2–12)
ANION GAP SERPL CALC-SCNC: 9 MMOL/L (ref 2–12)
APPEARANCE UR: CLEAR
AST SERPL-CCNC: 11 U/L (ref 10–35)
BACTERIA URNS QL MICRO: NEGATIVE /HPF
BASE DEFICIT BLD-SCNC: 2.4 MMOL/L
BASE DEFICIT BLDV-SCNC: 5.3 MMOL/L
BASOPHILS # BLD: 0.06 K/UL (ref 0–0.1)
BASOPHILS NFR BLD: 0.9 % (ref 0–1)
BDY SITE: ABNORMAL
BILIRUB SERPL-MCNC: <0.2 MG/DL (ref 0.2–1)
BILIRUB UR QL: NEGATIVE
BUN SERPL-MCNC: 20 MG/DL (ref 6–20)
BUN SERPL-MCNC: 23 MG/DL (ref 6–20)
BUN SERPL-MCNC: 26 MG/DL (ref 6–20)
BUN/CREAT SERPL: 23 (ref 12–20)
BUN/CREAT SERPL: 26 (ref 12–20)
BUN/CREAT SERPL: 27 (ref 12–20)
CALCIUM SERPL-MCNC: 7.7 MG/DL (ref 8.6–10)
CALCIUM SERPL-MCNC: 8.2 MG/DL (ref 8.5–10.1)
CALCIUM SERPL-MCNC: 8.8 MG/DL (ref 8.6–10)
CHLORIDE SERPL-SCNC: 110 MMOL/L (ref 97–108)
CHLORIDE SERPL-SCNC: 87 MMOL/L (ref 98–107)
CHLORIDE SERPL-SCNC: 99 MMOL/L (ref 98–107)
CO2 SERPL-SCNC: 19 MMOL/L (ref 22–29)
CO2 SERPL-SCNC: 20 MMOL/L (ref 21–32)
CO2 SERPL-SCNC: 20 MMOL/L (ref 22–29)
COLOR UR: ABNORMAL
COMMENT:: NORMAL
CREAT SERPL-MCNC: 0.74 MG/DL (ref 0.55–1.02)
CREAT SERPL-MCNC: 0.89 MG/DL (ref 0.5–0.9)
CREAT SERPL-MCNC: 1.11 MG/DL (ref 0.5–0.9)
DIFFERENTIAL METHOD BLD: NORMAL
EOSINOPHIL # BLD: 0.14 K/UL (ref 0–0.4)
EOSINOPHIL NFR BLD: 2.1 % (ref 0–7)
EPITH CASTS URNS QL MICRO: ABNORMAL /LPF
ERYTHROCYTE [DISTWIDTH] IN BLOOD BY AUTOMATED COUNT: 12.8 % (ref 11.5–14.5)
GLOBULIN SER CALC-MCNC: 2.8 G/DL (ref 2–4)
GLUCOSE BLD STRIP.AUTO-MCNC: 198 MG/DL (ref 65–117)
GLUCOSE BLD STRIP.AUTO-MCNC: 233 MG/DL (ref 65–117)
GLUCOSE BLD STRIP.AUTO-MCNC: 274 MG/DL (ref 65–117)
GLUCOSE BLD STRIP.AUTO-MCNC: 284 MG/DL (ref 65–117)
GLUCOSE BLD STRIP.AUTO-MCNC: 445 MG/DL (ref 65–117)
GLUCOSE BLD STRIP.AUTO-MCNC: >600 MG/DL (ref 65–117)
GLUCOSE SERPL-MCNC: 278 MG/DL (ref 65–100)
GLUCOSE SERPL-MCNC: 598 MG/DL (ref 65–100)
GLUCOSE SERPL-MCNC: 805 MG/DL (ref 65–100)
GLUCOSE UR STRIP.AUTO-MCNC: >1000 MG/DL
HCG, URINE, POC: NEGATIVE
HCO3 BLD-SCNC: 22.6 MMOL/L (ref 21–28)
HCO3 BLDV-SCNC: 18 MMOL/L (ref 23–28)
HCT VFR BLD AUTO: 38 % (ref 35–47)
HGB BLD-MCNC: 13.4 G/DL (ref 11.5–16)
HGB UR QL STRIP: NEGATIVE
IMM GRANULOCYTES # BLD AUTO: 0.02 K/UL (ref 0–0.04)
IMM GRANULOCYTES NFR BLD AUTO: 0.3 % (ref 0–0.5)
KETONES UR QL STRIP.AUTO: 15 MG/DL
LACTATE BLD-SCNC: 1.32 MMOL/L (ref 0.4–2)
LEUKOCYTE ESTERASE UR QL STRIP.AUTO: NEGATIVE
LIPASE SERPL-CCNC: 38 U/L (ref 13–60)
LYMPHOCYTES # BLD: 2.1 K/UL (ref 0.8–3.5)
LYMPHOCYTES NFR BLD: 31.9 % (ref 12–49)
Lab: NORMAL
MAGNESIUM SERPL-MCNC: 2.2 MG/DL (ref 1.6–2.4)
MCH RBC QN AUTO: 29.5 PG (ref 26–34)
MCHC RBC AUTO-ENTMCNC: 35.3 G/DL (ref 30–36.5)
MCV RBC AUTO: 83.7 FL (ref 80–99)
MONOCYTES # BLD: 0.35 K/UL (ref 0–1)
MONOCYTES NFR BLD: 5.3 % (ref 5–13)
NEGATIVE QC PASS/FAIL: NORMAL
NEUTS SEG # BLD: 3.92 K/UL (ref 1.8–8)
NEUTS SEG NFR BLD: 59.5 % (ref 32–75)
NITRITE UR QL STRIP.AUTO: NEGATIVE
NRBC # BLD: 0 K/UL (ref 0–0.01)
NRBC BLD-RTO: 0 PER 100 WBC
PCO2 BLD: 38.9 MMHG (ref 35–48)
PCO2 BLDV: 31.2 MMHG (ref 41–51)
PH BLD: 7.37 (ref 7.35–7.45)
PH BLDV: 7.39 (ref 7.32–7.42)
PH UR STRIP: 5.5 (ref 5–8)
PHOSPHATE SERPL-MCNC: 2.5 MG/DL (ref 2.6–4.7)
PLATELET # BLD AUTO: 381 K/UL (ref 150–400)
PMV BLD AUTO: 10.5 FL (ref 8.9–12.9)
PO2 BLD: 27 MMHG (ref 83–108)
PO2 BLDV: 58 MMHG (ref 25–40)
POSITIVE QC PASS/FAIL: NORMAL
POTASSIUM SERPL-SCNC: 3.8 MMOL/L (ref 3.5–5.1)
POTASSIUM SERPL-SCNC: 4.2 MMOL/L (ref 3.5–5.1)
POTASSIUM SERPL-SCNC: 4.8 MMOL/L (ref 3.5–5.1)
PROT SERPL-MCNC: 6.4 G/DL (ref 6.4–8.3)
PROT UR STRIP-MCNC: NEGATIVE MG/DL
RBC # BLD AUTO: 4.54 M/UL (ref 3.8–5.2)
RBC #/AREA URNS HPF: ABNORMAL /HPF
SAO2 % BLD: 49.7 % (ref 92–97)
SAO2 % BLDV: 90 % (ref 65–88)
SAO2% DEVICE SAO2% SENSOR NAME: ABNORMAL
SERVICE CMNT-IMP: ABNORMAL
SODIUM SERPL-SCNC: 126 MMOL/L (ref 136–145)
SODIUM SERPL-SCNC: 133 MMOL/L (ref 136–145)
SODIUM SERPL-SCNC: 139 MMOL/L (ref 136–145)
SP GR UR REFRACTOMETRY: >1.03 (ref 1–1.03)
SPECIMEN HOLD: NORMAL
SPECIMEN HOLD: NORMAL
SPECIMEN SITE: ABNORMAL
SPECIMEN TYPE: ABNORMAL
UROBILINOGEN UR QL STRIP.AUTO: 0.2 EU/DL (ref 0.2–1)
WBC # BLD AUTO: 6.6 K/UL (ref 3.6–11)
WBC URNS QL MICRO: ABNORMAL /HPF (ref 0–4)

## 2025-07-02 PROCEDURE — 80048 BASIC METABOLIC PNL TOTAL CA: CPT

## 2025-07-02 PROCEDURE — 82803 BLOOD GASES ANY COMBINATION: CPT

## 2025-07-02 PROCEDURE — 2500000003 HC RX 250 WO HCPCS: Performed by: STUDENT IN AN ORGANIZED HEALTH CARE EDUCATION/TRAINING PROGRAM

## 2025-07-02 PROCEDURE — 6360000002 HC RX W HCPCS: Performed by: STUDENT IN AN ORGANIZED HEALTH CARE EDUCATION/TRAINING PROGRAM

## 2025-07-02 PROCEDURE — 94640 AIRWAY INHALATION TREATMENT: CPT

## 2025-07-02 PROCEDURE — 83930 ASSAY OF BLOOD OSMOLALITY: CPT

## 2025-07-02 PROCEDURE — 2580000003 HC RX 258: Performed by: STUDENT IN AN ORGANIZED HEALTH CARE EDUCATION/TRAINING PROGRAM

## 2025-07-02 PROCEDURE — 82010 KETONE BODYS QUAN: CPT

## 2025-07-02 PROCEDURE — 80061 LIPID PANEL: CPT

## 2025-07-02 PROCEDURE — 36415 COLL VENOUS BLD VENIPUNCTURE: CPT

## 2025-07-02 PROCEDURE — 83721 ASSAY OF BLOOD LIPOPROTEIN: CPT

## 2025-07-02 PROCEDURE — 2580000003 HC RX 258: Performed by: EMERGENCY MEDICINE

## 2025-07-02 PROCEDURE — 81001 URINALYSIS AUTO W/SCOPE: CPT

## 2025-07-02 PROCEDURE — 6370000000 HC RX 637 (ALT 250 FOR IP): Performed by: EMERGENCY MEDICINE

## 2025-07-02 PROCEDURE — 2060000000 HC ICU INTERMEDIATE R&B

## 2025-07-02 PROCEDURE — 80053 COMPREHEN METABOLIC PANEL: CPT

## 2025-07-02 PROCEDURE — 83036 HEMOGLOBIN GLYCOSYLATED A1C: CPT

## 2025-07-02 PROCEDURE — 85025 COMPLETE CBC W/AUTO DIFF WBC: CPT

## 2025-07-02 PROCEDURE — 83690 ASSAY OF LIPASE: CPT

## 2025-07-02 PROCEDURE — 82962 GLUCOSE BLOOD TEST: CPT

## 2025-07-02 PROCEDURE — 6370000000 HC RX 637 (ALT 250 FOR IP): Performed by: STUDENT IN AN ORGANIZED HEALTH CARE EDUCATION/TRAINING PROGRAM

## 2025-07-02 PROCEDURE — 99285 EMERGENCY DEPT VISIT HI MDM: CPT

## 2025-07-02 PROCEDURE — 83605 ASSAY OF LACTIC ACID: CPT

## 2025-07-02 PROCEDURE — 84100 ASSAY OF PHOSPHORUS: CPT

## 2025-07-02 PROCEDURE — 83735 ASSAY OF MAGNESIUM: CPT

## 2025-07-02 RX ORDER — 0.9 % SODIUM CHLORIDE 0.9 %
15 INTRAVENOUS SOLUTION INTRAVENOUS ONCE
Status: DISCONTINUED | OUTPATIENT
Start: 2025-07-02 | End: 2025-07-04 | Stop reason: HOSPADM

## 2025-07-02 RX ORDER — 0.9 % SODIUM CHLORIDE 0.9 %
2000 INTRAVENOUS SOLUTION INTRAVENOUS ONCE
Status: COMPLETED | OUTPATIENT
Start: 2025-07-02 | End: 2025-07-02

## 2025-07-02 RX ORDER — DEXTROSE MONOHYDRATE, SODIUM CHLORIDE, AND POTASSIUM CHLORIDE 50; 1.49; 4.5 G/1000ML; G/1000ML; G/1000ML
INJECTION, SOLUTION INTRAVENOUS CONTINUOUS PRN
Status: DISCONTINUED | OUTPATIENT
Start: 2025-07-02 | End: 2025-07-02

## 2025-07-02 RX ORDER — DEXTROSE MONOHYDRATE AND SODIUM CHLORIDE 5; .45 G/100ML; G/100ML
INJECTION, SOLUTION INTRAVENOUS CONTINUOUS PRN
Status: DISCONTINUED | OUTPATIENT
Start: 2025-07-02 | End: 2025-07-02

## 2025-07-02 RX ORDER — POTASSIUM CHLORIDE 7.45 MG/ML
10 INJECTION INTRAVENOUS PRN
Status: DISCONTINUED | OUTPATIENT
Start: 2025-07-02 | End: 2025-07-02

## 2025-07-02 RX ORDER — PANTOPRAZOLE SODIUM 40 MG/1
40 TABLET, DELAYED RELEASE ORAL
Status: DISCONTINUED | OUTPATIENT
Start: 2025-07-03 | End: 2025-07-04 | Stop reason: HOSPADM

## 2025-07-02 RX ORDER — ENOXAPARIN SODIUM 100 MG/ML
40 INJECTION SUBCUTANEOUS DAILY
Status: DISCONTINUED | OUTPATIENT
Start: 2025-07-02 | End: 2025-07-04 | Stop reason: HOSPADM

## 2025-07-02 RX ORDER — OXYCODONE AND ACETAMINOPHEN 10; 325 MG/1; MG/1
1 TABLET ORAL EVERY 4 HOURS PRN
Refills: 0 | Status: DISCONTINUED | OUTPATIENT
Start: 2025-07-02 | End: 2025-07-04 | Stop reason: HOSPADM

## 2025-07-02 RX ORDER — POLYETHYLENE GLYCOL 3350 17 G/17G
17 POWDER, FOR SOLUTION ORAL DAILY PRN
Status: DISCONTINUED | OUTPATIENT
Start: 2025-07-02 | End: 2025-07-04 | Stop reason: HOSPADM

## 2025-07-02 RX ORDER — SODIUM CHLORIDE, SODIUM LACTATE, POTASSIUM CHLORIDE, CALCIUM CHLORIDE 600; 310; 30; 20 MG/100ML; MG/100ML; MG/100ML; MG/100ML
INJECTION, SOLUTION INTRAVENOUS CONTINUOUS
Status: DISCONTINUED | OUTPATIENT
Start: 2025-07-02 | End: 2025-07-02

## 2025-07-02 RX ORDER — FLUTICASONE PROPIONATE 50 MCG
1 SPRAY, SUSPENSION (ML) NASAL DAILY PRN
Status: DISCONTINUED | OUTPATIENT
Start: 2025-07-02 | End: 2025-07-04 | Stop reason: HOSPADM

## 2025-07-02 RX ORDER — IPRATROPIUM BROMIDE AND ALBUTEROL SULFATE 2.5; .5 MG/3ML; MG/3ML
1 SOLUTION RESPIRATORY (INHALATION)
Status: DISCONTINUED | OUTPATIENT
Start: 2025-07-02 | End: 2025-07-03

## 2025-07-02 RX ORDER — MAGNESIUM SULFATE IN WATER 40 MG/ML
2000 INJECTION, SOLUTION INTRAVENOUS PRN
Status: DISCONTINUED | OUTPATIENT
Start: 2025-07-02 | End: 2025-07-02

## 2025-07-02 RX ORDER — SODIUM CHLORIDE 9 MG/ML
INJECTION, SOLUTION INTRAVENOUS CONTINUOUS
Status: DISCONTINUED | OUTPATIENT
Start: 2025-07-02 | End: 2025-07-02

## 2025-07-02 RX ORDER — ATORVASTATIN CALCIUM 20 MG/1
10 TABLET, FILM COATED ORAL DAILY
Status: DISCONTINUED | OUTPATIENT
Start: 2025-07-02 | End: 2025-07-04 | Stop reason: HOSPADM

## 2025-07-02 RX ORDER — ALPRAZOLAM 0.5 MG
0.5 TABLET ORAL NIGHTLY PRN
Status: DISCONTINUED | OUTPATIENT
Start: 2025-07-02 | End: 2025-07-04 | Stop reason: HOSPADM

## 2025-07-02 RX ORDER — MINERAL OIL AND WHITE PETROLATUM 150; 830 MG/G; MG/G
OINTMENT OPHTHALMIC PRN
Status: DISCONTINUED | OUTPATIENT
Start: 2025-07-02 | End: 2025-07-04 | Stop reason: HOSPADM

## 2025-07-02 RX ORDER — SODIUM CHLORIDE 9 MG/ML
INJECTION, SOLUTION INTRAVENOUS CONTINUOUS
Status: DISCONTINUED | OUTPATIENT
Start: 2025-07-02 | End: 2025-07-04 | Stop reason: HOSPADM

## 2025-07-02 RX ORDER — POTASSIUM CHLORIDE 7.45 MG/ML
10 INJECTION INTRAVENOUS PRN
Status: DISCONTINUED | OUTPATIENT
Start: 2025-07-02 | End: 2025-07-04 | Stop reason: HOSPADM

## 2025-07-02 RX ORDER — DEXTROSE MONOHYDRATE AND SODIUM CHLORIDE 5; .45 G/100ML; G/100ML
INJECTION, SOLUTION INTRAVENOUS CONTINUOUS PRN
Status: DISPENSED | OUTPATIENT
Start: 2025-07-02 | End: 2025-07-03

## 2025-07-02 RX ORDER — MAGNESIUM SULFATE IN WATER 40 MG/ML
2000 INJECTION, SOLUTION INTRAVENOUS PRN
Status: DISCONTINUED | OUTPATIENT
Start: 2025-07-02 | End: 2025-07-04 | Stop reason: HOSPADM

## 2025-07-02 RX ORDER — 0.9 % SODIUM CHLORIDE 0.9 %
15 INTRAVENOUS SOLUTION INTRAVENOUS ONCE
Status: DISCONTINUED | OUTPATIENT
Start: 2025-07-02 | End: 2025-07-02

## 2025-07-02 RX ORDER — ALBUTEROL SULFATE 90 UG/1
2 INHALANT RESPIRATORY (INHALATION) 4 TIMES DAILY PRN
Status: DISCONTINUED | OUTPATIENT
Start: 2025-07-02 | End: 2025-07-02

## 2025-07-02 RX ADMIN — IPRATROPIUM BROMIDE AND ALBUTEROL SULFATE 1 DOSE: .5; 3 SOLUTION RESPIRATORY (INHALATION) at 19:15

## 2025-07-02 RX ADMIN — MINERAL OIL, WHITE PETROLATUM: .03; .94 OINTMENT OPHTHALMIC at 21:04

## 2025-07-02 RX ADMIN — POTASSIUM CHLORIDE 10 MEQ: 7.46 INJECTION, SOLUTION INTRAVENOUS at 23:36

## 2025-07-02 RX ADMIN — SODIUM CHLORIDE 2000 ML: 0.9 INJECTION, SOLUTION INTRAVENOUS at 16:24

## 2025-07-02 RX ADMIN — SODIUM PHOSPHATE, MONOBASIC, MONOHYDRATE AND SODIUM PHOSPHATE, DIBASIC, ANHYDROUS 15 MMOL: 276; 142 INJECTION, SOLUTION INTRAVENOUS at 23:59

## 2025-07-02 RX ADMIN — DEXTROSE AND SODIUM CHLORIDE: 5; .45 INJECTION, SOLUTION INTRAVENOUS at 19:50

## 2025-07-02 RX ADMIN — ALPRAZOLAM 0.5 MG: 0.5 TABLET ORAL at 21:03

## 2025-07-02 RX ADMIN — ATORVASTATIN CALCIUM 10 MG: 20 TABLET, FILM COATED ORAL at 21:02

## 2025-07-02 RX ADMIN — HUMAN INSULIN 10 UNITS: 100 INJECTION, SOLUTION SUBCUTANEOUS at 16:26

## 2025-07-02 RX ADMIN — FLUTICASONE PROPIONATE 1 SPRAY: 50 SPRAY, METERED NASAL at 23:55

## 2025-07-02 RX ADMIN — ENOXAPARIN SODIUM 40 MG: 100 INJECTION SUBCUTANEOUS at 18:12

## 2025-07-02 RX ADMIN — SODIUM CHLORIDE 10.8 UNITS/HR: 9 INJECTION, SOLUTION INTRAVENOUS at 17:28

## 2025-07-02 ASSESSMENT — PAIN SCALES - GENERAL: PAINLEVEL_OUTOF10: 8

## 2025-07-02 ASSESSMENT — LIFESTYLE VARIABLES
HOW MANY STANDARD DRINKS CONTAINING ALCOHOL DO YOU HAVE ON A TYPICAL DAY: 1 OR 2
HOW OFTEN DO YOU HAVE A DRINK CONTAINING ALCOHOL: MONTHLY OR LESS

## 2025-07-02 ASSESSMENT — PAIN DESCRIPTION - LOCATION: LOCATION: ABDOMEN;BACK;NECK

## 2025-07-02 ASSESSMENT — PAIN - FUNCTIONAL ASSESSMENT: PAIN_FUNCTIONAL_ASSESSMENT: 0-10

## 2025-07-02 NOTE — CONSULTS
Session ID: [id number]  Session Duration: 3 minutes  Language: Tamazight   ID: #[id number]   Name: Angela

## 2025-07-02 NOTE — ED PROVIDER NOTES
[place] EMERGENCY DEPARTMENT  EMERGENCY DEPARTMENT ENCOUNTER      Pt Name: Yasmine Mcdonald  MRN: [id number]  Birthdate 1970  Date of evaluation: 7/2/2025  Provider: Wilmer Coy MD    CHIEF COMPLAINT       Chief Complaint   Patient presents with    Hyperglycemia         HISTORY OF PRESENT ILLNESS   (Location/Symptom, Timing/Onset, Context/Setting, Quality, Duration, Modifying Factors, Severity)  Note limiting factors.   55-year-old with a history of diabetes, hyperlipidemia, GERD, arthritis, headaches, peptic ulcer disease.  She presents with complaints of hyperglycemia.  History obtained with the assistance of the translation services.  She states that she has been out of insulin for the past 4 months.  She complains of polyuria and polydipsia.  She has had \"cloudy vision.\"  She saw her PCP 3 to 4 days ago.  She believes her PCP prescribed her insulin, but she has not picked up as of yet.  She states that she has had insurance issues but believes it is taken care of now.          Review of External Medical Records:     Nursing Notes were reviewed.    REVIEW OF SYSTEMS    (2-9 systems for level 4, 10 or more for level 5)     Review of Systems    Except as noted above the remainder of the review of systems was reviewed and negative.       PAST MEDICAL HISTORY     Past Medical History:   Diagnosis Date    Arthritis     Chronic headaches     Diabetes (HCC)     GERD (gastroesophageal reflux disease)     Hypercholesterolemia     PUD (peptic ulcer disease)          SURGICAL HISTORY       Past Surgical History:   Procedure Laterality Date    BREAST BIOPSY Right 10+ years ago    neg    BREAST SURGERY      right breast biopsy         CURRENT MEDICATIONS       Previous Medications    ALBUTEROL SULFATE HFA (VENTOLIN HFA) 108 (90 BASE) MCG/ACT INHALER    Inhale 2 puffs into the lungs 4 times daily as needed for Wheezing    ATORVASTATIN (LIPITOR) 10 MG TABLET    Take 1 tablet by mouth daily    CLOTRIMAZOLE-BETAMETHASONE

## 2025-07-02 NOTE — H&P
Hospitalist Admission Note    NAME:  Yasmine Mcdonald   :  1970   MRN:  [id number]     Date/Time:  2025 5:15 PM    Patient PCP: Billie Leon MD  ________________________________________________________________________    Given the patient's current clinical presentation, I have a high level of concern for decompensation if discharged from the emergency department.  Complex decision making was performed, which includes reviewing the patient's available past medical records, laboratory results, and x-ray films.       My assessment of this patient's clinical condition and my plan of care is as follows.    Assessment / Plan:    55 y.o. female with past medical history of DM, HLD, GERD, arthritis, PUD who presents to the emergency room today with main complaint of hyperglycemia, patient was found to have sugar of 805/with high anion gap and pH 7.37    # HHS  Secondary to medical noncompliance as patient has run out of her insulin x 4 months now, no signs of any acute infection  On present emergency room blood glucose 805, a gap 19, bicarb 20  Patient was placed on insulin drip in the ER  Labs have improved at time of arrival to our facility from the ER   Plan  Continue insulin drip  IVF  Glucose checks per protocol/monitor electrolytes  Check serum osmolarity currently pending/A1c  Diabetes management consulted    #High anion gap  A gap 19  Secondary to underlying hyperglycemia    # Pseudohyponatremia  Secondary to underlying hyperglycemia  Plan  Treat underlying cause    #ENOC  Improving  Secondary underlying dehydration  Continue IVF  Repeat labs    # Hx HLD  Follow-up on repeat lipid panel  Continue home statin    Hx GERD/PUD  Continue Protonix    #Hx asthma  Not in exacerbation  DuoNebs as needed        I have personally reviewed the radiographs, laboratory data in Epic and decisions and statements above are based partially on this personal interpretation.    Code Status: Full Code  DVT Prophylaxis:

## 2025-07-02 NOTE — ED NOTES
TRANSFER - OUT REPORT:    Verbal report given to LOUIS Santoro and [facility] on Yasmine Mcdonald  being transferred to [facility] bed 334 for routine progression of patient care       Report consisted of patient's Situation, Background, Assessment and   Recommendations(SBAR).     Information from the following report(s) ED SBAR, Adult Overview, Intake/Output, MAR, Recent Results, Med Rec Status, Cardiac Rhythm NSR, and Neuro Assessment was reviewed with the receiving nurse.    Wheeler Fall Assessment:    Presents to emergency department  because of falls (Syncope, seizure, or loss of consciousness): No  Age > 70: No  Altered Mental Status, Intoxication with alcohol or substance confusion (Disorientation, impaired judgment, poor safety awaremess, or inability to follow instructions): No  Impaired Mobility: Ambulates or transfers with assistive devices or assistance; Unable to ambulate or transer.: No  Nursing Judgement: No          Lines:   Peripheral IV 07/02/25 Right Antecubital (Active)   Site Assessment Clean, dry & intact 07/02/25 1608   Line Status Blood return noted 07/02/25 1608   Phlebitis Assessment No symptoms 07/02/25 1608   Infiltration Assessment 0 07/02/25 1608       Peripheral IV 07/02/25 Left Antecubital (Active)   Site Assessment Clean, dry & intact 07/02/25 1721   Line Status Flushed;Blood return noted 07/02/25 1721   Line Care Line pulled back 07/02/25 1721   Phlebitis Assessment No symptoms 07/02/25 1721   Infiltration Assessment 0 07/02/25 1721   Alcohol Cap Used No 07/02/25 1721   Dressing Status Clean, dry & intact 07/02/25 1721   Dressing Type Securing device 07/02/25 1721        Opportunity for questions and clarification was provided.      Patient transported with:  Monitor and Patient-specific medications from Pharmacy    Patient being transferred with Insulin drip running @ 3.9 units/hr in right 20G AC and LR running @ 125 mL/hr to left 20G AC. Dual verified with LOUIS Monahan  and [facility]. Notified Maude

## 2025-07-03 ENCOUNTER — APPOINTMENT (OUTPATIENT)
Facility: HOSPITAL | Age: 55
End: 2025-07-03
Payer: MEDICAID

## 2025-07-03 PROBLEM — R73.09 HEMOGLOBIN A1C GREATER THAN 9%, INDICATING POOR DIABETIC CONTROL: Status: ACTIVE | Noted: 2025-07-03

## 2025-07-03 PROBLEM — E11.10 DIABETIC KETOACIDOSIS WITHOUT COMA ASSOCIATED WITH TYPE 2 DIABETES MELLITUS (HCC): Status: ACTIVE | Noted: 2025-07-03

## 2025-07-03 PROBLEM — E11.10 TYPE 2 DIABETES MELLITUS WITH KETOACIDOSIS WITHOUT COMA (HCC): Status: ACTIVE | Noted: 2025-07-03

## 2025-07-03 LAB
AMPHET UR QL SCN: NEGATIVE
ANION GAP SERPL CALC-SCNC: 4 MMOL/L (ref 2–12)
ANION GAP SERPL CALC-SCNC: 6 MMOL/L (ref 2–12)
ANION GAP SERPL CALC-SCNC: 6 MMOL/L (ref 2–12)
ANION GAP SERPL CALC-SCNC: 7 MMOL/L (ref 2–12)
B-OH-BUTYR SERPL-SCNC: 2.38 MMOL/L
BARBITURATES UR QL SCN: NEGATIVE
BASE DEFICIT BLDV-SCNC: 3.4 MMOL/L
BDY SITE: ABNORMAL
BENZODIAZ UR QL: NEGATIVE
BUN SERPL-MCNC: 11 MG/DL (ref 6–20)
BUN SERPL-MCNC: 11 MG/DL (ref 6–20)
BUN SERPL-MCNC: 15 MG/DL (ref 6–20)
BUN SERPL-MCNC: 15 MG/DL (ref 6–20)
BUN/CREAT SERPL: 17 (ref 12–20)
BUN/CREAT SERPL: 18 (ref 12–20)
BUN/CREAT SERPL: 22 (ref 12–20)
BUN/CREAT SERPL: 22 (ref 12–20)
CALCIUM SERPL-MCNC: 8.2 MG/DL (ref 8.5–10.1)
CALCIUM SERPL-MCNC: 8.2 MG/DL (ref 8.5–10.1)
CALCIUM SERPL-MCNC: 8.3 MG/DL (ref 8.5–10.1)
CALCIUM SERPL-MCNC: 8.3 MG/DL (ref 8.5–10.1)
CANNABINOIDS UR QL SCN: NEGATIVE
CHLORIDE SERPL-SCNC: 113 MMOL/L (ref 97–108)
CHLORIDE SERPL-SCNC: 113 MMOL/L (ref 97–108)
CHLORIDE SERPL-SCNC: 114 MMOL/L (ref 97–108)
CHLORIDE SERPL-SCNC: 115 MMOL/L (ref 97–108)
CHOLEST SERPL-MCNC: 259 MG/DL
CO2 SERPL-SCNC: 21 MMOL/L (ref 21–32)
CO2 SERPL-SCNC: 23 MMOL/L (ref 21–32)
CO2 SERPL-SCNC: 23 MMOL/L (ref 21–32)
CO2 SERPL-SCNC: 24 MMOL/L (ref 21–32)
COCAINE UR QL SCN: NEGATIVE
CREAT SERPL-MCNC: 0.61 MG/DL (ref 0.55–1.02)
CREAT SERPL-MCNC: 0.64 MG/DL (ref 0.55–1.02)
CREAT SERPL-MCNC: 0.67 MG/DL (ref 0.55–1.02)
CREAT SERPL-MCNC: 0.68 MG/DL (ref 0.55–1.02)
EST. AVERAGE GLUCOSE BLD GHB EST-MCNC: ABNORMAL MG/DL
GLUCOSE BLD STRIP.AUTO-MCNC: 145 MG/DL (ref 65–117)
GLUCOSE BLD STRIP.AUTO-MCNC: 156 MG/DL (ref 65–117)
GLUCOSE BLD STRIP.AUTO-MCNC: 157 MG/DL (ref 65–117)
GLUCOSE BLD STRIP.AUTO-MCNC: 161 MG/DL (ref 65–117)
GLUCOSE BLD STRIP.AUTO-MCNC: 163 MG/DL (ref 65–117)
GLUCOSE BLD STRIP.AUTO-MCNC: 165 MG/DL (ref 65–117)
GLUCOSE BLD STRIP.AUTO-MCNC: 178 MG/DL (ref 65–117)
GLUCOSE BLD STRIP.AUTO-MCNC: 185 MG/DL (ref 65–117)
GLUCOSE BLD STRIP.AUTO-MCNC: 190 MG/DL (ref 65–117)
GLUCOSE BLD STRIP.AUTO-MCNC: 200 MG/DL (ref 65–117)
GLUCOSE BLD STRIP.AUTO-MCNC: 217 MG/DL (ref 65–117)
GLUCOSE BLD STRIP.AUTO-MCNC: 219 MG/DL (ref 65–117)
GLUCOSE BLD STRIP.AUTO-MCNC: 424 MG/DL (ref 65–117)
GLUCOSE SERPL-MCNC: 153 MG/DL (ref 65–100)
GLUCOSE SERPL-MCNC: 156 MG/DL (ref 65–100)
GLUCOSE SERPL-MCNC: 160 MG/DL (ref 65–100)
GLUCOSE SERPL-MCNC: 166 MG/DL (ref 65–100)
HBA1C MFR BLD: >14 % (ref 4–5.6)
HCO3 BLDV-SCNC: 20 MMOL/L (ref 23–28)
HDLC SERPL-MCNC: 27 MG/DL
HDLC SERPL: 9.6 (ref 0–5)
LDLC SERPL CALC-MCNC: ABNORMAL MG/DL (ref 0–100)
LDLC SERPL DIRECT ASSAY-MCNC: 112 MG/DL (ref 0–100)
LIPASE SERPL-CCNC: 20 U/L (ref 13–75)
Lab: ABNORMAL
MAGNESIUM SERPL-MCNC: 2 MG/DL (ref 1.6–2.4)
MAGNESIUM SERPL-MCNC: 2.2 MG/DL (ref 1.6–2.4)
METHADONE UR QL: NEGATIVE
OPIATES UR QL: POSITIVE
OSMOLALITY SERPL: 319 MOSM/KG H2O
PCO2 BLDV: 33.4 MMHG (ref 41–51)
PCP UR QL: NEGATIVE
PH BLDV: 7.4 (ref 7.32–7.42)
PHOSPHATE SERPL-MCNC: 3.2 MG/DL (ref 2.6–4.7)
PHOSPHATE SERPL-MCNC: 3.3 MG/DL (ref 2.6–4.7)
PHOSPHATE SERPL-MCNC: 3.9 MG/DL (ref 2.6–4.7)
PHOSPHATE SERPL-MCNC: 4.1 MG/DL (ref 2.6–4.7)
PO2 BLDV: 50 MMHG (ref 25–40)
POTASSIUM SERPL-SCNC: 3.4 MMOL/L (ref 3.5–5.1)
POTASSIUM SERPL-SCNC: 3.6 MMOL/L (ref 3.5–5.1)
POTASSIUM SERPL-SCNC: 3.7 MMOL/L (ref 3.5–5.1)
POTASSIUM SERPL-SCNC: 3.8 MMOL/L (ref 3.5–5.1)
SAO2 % BLDV: 86 % (ref 65–88)
SAO2% DEVICE SAO2% SENSOR NAME: ABNORMAL
SERVICE CMNT-IMP: ABNORMAL
SODIUM SERPL-SCNC: 141 MMOL/L (ref 136–145)
SODIUM SERPL-SCNC: 142 MMOL/L (ref 136–145)
SODIUM SERPL-SCNC: 143 MMOL/L (ref 136–145)
SODIUM SERPL-SCNC: 143 MMOL/L (ref 136–145)
SPECIMEN HOLD: NORMAL
SPECIMEN SITE: ABNORMAL
TRIGL SERPL-MCNC: 1225 MG/DL
VLDLC SERPL CALC-MCNC: ABNORMAL MG/DL

## 2025-07-03 PROCEDURE — 6370000000 HC RX 637 (ALT 250 FOR IP): Performed by: STUDENT IN AN ORGANIZED HEALTH CARE EDUCATION/TRAINING PROGRAM

## 2025-07-03 PROCEDURE — 83690 ASSAY OF LIPASE: CPT

## 2025-07-03 PROCEDURE — 82962 GLUCOSE BLOOD TEST: CPT

## 2025-07-03 PROCEDURE — 94640 AIRWAY INHALATION TREATMENT: CPT

## 2025-07-03 PROCEDURE — 83735 ASSAY OF MAGNESIUM: CPT

## 2025-07-03 PROCEDURE — 2060000000 HC ICU INTERMEDIATE R&B

## 2025-07-03 PROCEDURE — 80048 BASIC METABOLIC PNL TOTAL CA: CPT

## 2025-07-03 PROCEDURE — 84100 ASSAY OF PHOSPHORUS: CPT

## 2025-07-03 PROCEDURE — 36415 COLL VENOUS BLD VENIPUNCTURE: CPT

## 2025-07-03 PROCEDURE — 2580000003 HC RX 258: Performed by: EMERGENCY MEDICINE

## 2025-07-03 PROCEDURE — 6370000000 HC RX 637 (ALT 250 FOR IP)

## 2025-07-03 PROCEDURE — 99222 1ST HOSP IP/OBS MODERATE 55: CPT

## 2025-07-03 PROCEDURE — 80307 DRUG TEST PRSMV CHEM ANLYZR: CPT

## 2025-07-03 PROCEDURE — 74176 CT ABD & PELVIS W/O CONTRAST: CPT

## 2025-07-03 PROCEDURE — 6370000000 HC RX 637 (ALT 250 FOR IP): Performed by: INTERNAL MEDICINE

## 2025-07-03 PROCEDURE — 82803 BLOOD GASES ANY COMBINATION: CPT

## 2025-07-03 PROCEDURE — 6360000002 HC RX W HCPCS: Performed by: STUDENT IN AN ORGANIZED HEALTH CARE EDUCATION/TRAINING PROGRAM

## 2025-07-03 PROCEDURE — 94761 N-INVAS EAR/PLS OXIMETRY MLT: CPT

## 2025-07-03 RX ORDER — GLUCOSAMINE HCL/CHONDROITIN SU 500-400 MG
CAPSULE ORAL
Qty: 100 STRIP | Refills: 2 | Status: SHIPPED | OUTPATIENT
Start: 2025-07-03

## 2025-07-03 RX ORDER — ALCOHOL ANTISEPTIC PADS
PADS, MEDICATED (EA) TOPICAL
Qty: 100 EACH | Refills: 2 | Status: SHIPPED | OUTPATIENT
Start: 2025-07-03

## 2025-07-03 RX ORDER — DIPHENHYDRAMINE HYDROCHLORIDE 25 MG/1
CAPSULE, LIQUID FILLED ORAL
Qty: 1 KIT | Refills: 0 | Status: SHIPPED | OUTPATIENT
Start: 2025-07-03

## 2025-07-03 RX ORDER — IPRATROPIUM BROMIDE AND ALBUTEROL SULFATE 2.5; .5 MG/3ML; MG/3ML
1 SOLUTION RESPIRATORY (INHALATION) EVERY 4 HOURS PRN
Status: DISCONTINUED | OUTPATIENT
Start: 2025-07-03 | End: 2025-07-04 | Stop reason: HOSPADM

## 2025-07-03 RX ORDER — INSULIN LISPRO 100 [IU]/ML
0-8 INJECTION, SOLUTION INTRAVENOUS; SUBCUTANEOUS
Status: DISCONTINUED | OUTPATIENT
Start: 2025-07-03 | End: 2025-07-04 | Stop reason: HOSPADM

## 2025-07-03 RX ORDER — INSULIN GLARGINE 100 [IU]/ML
24 INJECTION, SOLUTION SUBCUTANEOUS DAILY
Status: DISCONTINUED | OUTPATIENT
Start: 2025-07-03 | End: 2025-07-04

## 2025-07-03 RX ORDER — ACETAMINOPHEN 325 MG/1
650 TABLET ORAL EVERY 6 HOURS PRN
Status: DISCONTINUED | OUTPATIENT
Start: 2025-07-03 | End: 2025-07-04 | Stop reason: HOSPADM

## 2025-07-03 RX ORDER — DEXTROSE MONOHYDRATE 100 MG/ML
INJECTION, SOLUTION INTRAVENOUS CONTINUOUS PRN
Status: DISCONTINUED | OUTPATIENT
Start: 2025-07-03 | End: 2025-07-04 | Stop reason: HOSPADM

## 2025-07-03 RX ORDER — INSULIN LISPRO 100 [IU]/ML
0.08 INJECTION, SOLUTION INTRAVENOUS; SUBCUTANEOUS
Status: DISCONTINUED | OUTPATIENT
Start: 2025-07-03 | End: 2025-07-04

## 2025-07-03 RX ADMIN — INSULIN LISPRO 2 UNITS: 100 INJECTION, SOLUTION INTRAVENOUS; SUBCUTANEOUS at 21:07

## 2025-07-03 RX ADMIN — ATORVASTATIN CALCIUM 10 MG: 20 TABLET, FILM COATED ORAL at 21:06

## 2025-07-03 RX ADMIN — OXYCODONE AND ACETAMINOPHEN 1 TABLET: 10; 325 TABLET ORAL at 16:30

## 2025-07-03 RX ADMIN — DEXTROSE AND SODIUM CHLORIDE: 5; .45 INJECTION, SOLUTION INTRAVENOUS at 03:11

## 2025-07-03 RX ADMIN — PANTOPRAZOLE SODIUM 40 MG: 40 TABLET, DELAYED RELEASE ORAL at 06:15

## 2025-07-03 RX ADMIN — MINERAL OIL, WHITE PETROLATUM: .03; .94 OINTMENT OPHTHALMIC at 21:08

## 2025-07-03 RX ADMIN — INSULIN LISPRO 6 UNITS: 100 INJECTION, SOLUTION INTRAVENOUS; SUBCUTANEOUS at 17:34

## 2025-07-03 RX ADMIN — ENOXAPARIN SODIUM 40 MG: 100 INJECTION SUBCUTANEOUS at 08:29

## 2025-07-03 RX ADMIN — POTASSIUM CHLORIDE 10 MEQ: 7.46 INJECTION, SOLUTION INTRAVENOUS at 00:54

## 2025-07-03 RX ADMIN — IPRATROPIUM BROMIDE AND ALBUTEROL SULFATE 1 DOSE: .5; 3 SOLUTION RESPIRATORY (INHALATION) at 07:54

## 2025-07-03 RX ADMIN — POTASSIUM CHLORIDE 10 MEQ: 7.46 INJECTION, SOLUTION INTRAVENOUS at 03:06

## 2025-07-03 RX ADMIN — ACETAMINOPHEN 650 MG: 325 TABLET ORAL at 08:28

## 2025-07-03 RX ADMIN — OXYCODONE AND ACETAMINOPHEN 1 TABLET: 10; 325 TABLET ORAL at 06:15

## 2025-07-03 RX ADMIN — INSULIN LISPRO 8 UNITS: 100 INJECTION, SOLUTION INTRAVENOUS; SUBCUTANEOUS at 17:35

## 2025-07-03 RX ADMIN — INSULIN GLARGINE 24 UNITS: 100 INJECTION, SOLUTION SUBCUTANEOUS at 08:28

## 2025-07-03 ASSESSMENT — PAIN SCALES - GENERAL
PAINLEVEL_OUTOF10: 7
PAINLEVEL_OUTOF10: 7
PAINLEVEL_OUTOF10: 2

## 2025-07-03 ASSESSMENT — PAIN DESCRIPTION - DESCRIPTORS: DESCRIPTORS: ACHING

## 2025-07-03 ASSESSMENT — PAIN DESCRIPTION - LOCATION: LOCATION: HEAD

## 2025-07-03 NOTE — CONSULTS
[facility]  DIABETES MANAGEMENT CONSULT    Consulted by Provider for advanced nursing evaluation and care for inpatient blood glucose management.    Evaluation and Action Plan   Yasmine Mcdonald is a 55 y.o. female with PMHx of T2DM, HLD, GERD, arthritis, PUD, who was admitted with hyperglycemia, polyuria, polydipsia, and cloudy vision. Labs on admission noted for BG > 600, A1c > 14%, Na 126, Cl 87, creat 1.11, beta hydroxy 2.38, AG 19, CO2 20, urine ketones. She was admitted with DKA and put in on insulin infusion DKA protocol. DM consulted to assist with home assessment and BG management.     Used [employer]  for interview. Patient with T2DM for about 22 years. Was on Metformin originally but gave her upset stomach. More recently, she has been on Tresiba and Mounjaro, but had insurance lapse starting in March and could no longer afford the medications. Glucometer also broken so was not checking BG recently. Diet is not unreasonable, but with diabetes for so many years, she likely has little insulin production. A1c > 14%. She does have a PCP in [place] (Sebastián Matias). Advised her to make appointment with him in a few weeks.     Admission  and labs consistent with mild DKA. She has been on insulin infusion overnight. DKA resolved this am. Transitioned off drip this am. She is tired but feeling better and states she is hungry. Informed Dr. Hare of patient c/o vaginal itching. Patient's insurance just kicked in July 1, so should be okay to  medications now. Will still go with [employer] so patient can continue with this if she ever runs, since this does not require prescription.    Blood glucose pattern    Significant diabetes-related events over the past 24-72 hours  A1C > 14%  Insulin infusion for DKA overnight      Management Rationale Action Plan   Medication   Basal needs Using 0.35 units/kg/D based on weight  Lantus 24 units daily    Nutritional needs Covers

## 2025-07-03 NOTE — PROGRESS NOTES
0700 Bedside and Verbal shift change report given to LOUIS Townsend (oncoming nurse) by LOUIS Ibarra (offgoing nurse). Report included the following information Recent Results, Med Rec Status, and Cardiac Rhythm NSR.

## 2025-07-03 NOTE — PROGRESS NOTES
[facility]  [address] [place] [address] [phone]      Medical Progress Note      NAME: Yasmine Mcdonald   :  1970  MRM:  [id number]    Date/Time of service: 7/3/2025        Subjective:     Chief Complaint:  Patient was personally seen and examined by me during this time period.  Chart reviewed.  Follow-up DKA.  Endorses epigastric abdominal pain.  Some nausea; no vomiting.  Diabetes manager stated that patient had endorsed vaginal itching to her however patient denies any vaginal discharge or symptoms to me during encounter.           Objective:       Vitals:       Last 24hrs VS reviewed since prior progress note. Most recent are:    Vitals:    25 1500   BP: (!) 101/54   Pulse:    Resp:    Temp: 98.3 °F (36.8 °C)   SpO2:      SpO2 Readings from Last 6 Encounters:   25 92%   24 98%   24 95%   24 98%          Intake/Output Summary (Last 24 hours) at 7/3/2025 1715  Last data filed at 2025 1926  Gross per 24 hour   Intake 0 ml   Output --   Net 0 ml        Exam:     Physical Exam:    Gen:  Well-developed, well-nourished, in no acute distress  HEENT:  Pink conjunctivae, PERRL, hearing intact to voice  Resp:  No accessory muscle use, clear breath sounds without wheezes rales or rhonchi  Card:  RRR, No murmurs, normal S1, S2, no peripheral edema  Abd: Abdominal tenderness to palpation including epigastric  Musc:  No cyanosis or clubbing  Skin:  No rashes or ulcers, skin turgor is good  Neuro:  Cranial nerves 3-12 are grossly intact,follows commands appropriately  Psych:  Oriented to person, place, and time, Alert with good insight      Medications Reviewed: (see below)    Lab Data Reviewed: (see below)    ______________________________________________________________________    Medications:     Current Facility-Administered Medications   Medication Dose Route Frequency    glucose chewable tablet 16 g  4 tablet Oral PRN    dextrose bolus

## 2025-07-04 VITALS
OXYGEN SATURATION: 97 % | BODY MASS INDEX: 29.07 KG/M2 | RESPIRATION RATE: 16 BRPM | HEIGHT: 61 IN | DIASTOLIC BLOOD PRESSURE: 64 MMHG | TEMPERATURE: 98.2 F | WEIGHT: 154 LBS | SYSTOLIC BLOOD PRESSURE: 136 MMHG | HEART RATE: 66 BPM

## 2025-07-04 LAB
ANION GAP SERPL CALC-SCNC: 9 MMOL/L (ref 2–12)
BUN SERPL-MCNC: 13 MG/DL (ref 6–20)
BUN/CREAT SERPL: 19 (ref 12–20)
CALCIUM SERPL-MCNC: 8.3 MG/DL (ref 8.5–10.1)
CHLORIDE SERPL-SCNC: 110 MMOL/L (ref 97–108)
CO2 SERPL-SCNC: 22 MMOL/L (ref 21–32)
CREAT SERPL-MCNC: 0.69 MG/DL (ref 0.55–1.02)
GLUCOSE BLD STRIP.AUTO-MCNC: 219 MG/DL (ref 65–117)
GLUCOSE SERPL-MCNC: 201 MG/DL (ref 65–100)
POTASSIUM SERPL-SCNC: 4 MMOL/L (ref 3.5–5.1)
SERVICE CMNT-IMP: ABNORMAL
SODIUM SERPL-SCNC: 141 MMOL/L (ref 136–145)

## 2025-07-04 PROCEDURE — 80048 BASIC METABOLIC PNL TOTAL CA: CPT

## 2025-07-04 PROCEDURE — 36415 COLL VENOUS BLD VENIPUNCTURE: CPT

## 2025-07-04 PROCEDURE — 6370000000 HC RX 637 (ALT 250 FOR IP)

## 2025-07-04 PROCEDURE — 94761 N-INVAS EAR/PLS OXIMETRY MLT: CPT

## 2025-07-04 PROCEDURE — 6370000000 HC RX 637 (ALT 250 FOR IP): Performed by: INTERNAL MEDICINE

## 2025-07-04 PROCEDURE — 6370000000 HC RX 637 (ALT 250 FOR IP): Performed by: STUDENT IN AN ORGANIZED HEALTH CARE EDUCATION/TRAINING PROGRAM

## 2025-07-04 PROCEDURE — 82962 GLUCOSE BLOOD TEST: CPT

## 2025-07-04 RX ORDER — FENOFIBRATE 160 MG/1
160 TABLET ORAL DAILY
Qty: 30 TABLET | Refills: 0 | Status: SHIPPED | OUTPATIENT
Start: 2025-07-04

## 2025-07-04 RX ORDER — POLYETHYLENE GLYCOL 3350 17 G/17G
17 POWDER, FOR SOLUTION ORAL DAILY PRN
Qty: 30 PACKET | Refills: 0 | Status: SHIPPED | OUTPATIENT
Start: 2025-07-04 | End: 2025-08-03

## 2025-07-04 RX ORDER — DOCUSATE SODIUM 100 MG/1
100 CAPSULE, LIQUID FILLED ORAL 2 TIMES DAILY
Qty: 60 CAPSULE | Refills: 0 | Status: SHIPPED | OUTPATIENT
Start: 2025-07-04 | End: 2025-08-03

## 2025-07-04 RX ORDER — FENOFIBRATE 160 MG/1
160 TABLET ORAL DAILY
Status: DISCONTINUED | OUTPATIENT
Start: 2025-07-04 | End: 2025-07-04 | Stop reason: HOSPADM

## 2025-07-04 RX ORDER — ATORVASTATIN CALCIUM 40 MG/1
40 TABLET, FILM COATED ORAL DAILY
Qty: 30 TABLET | Refills: 0 | Status: SHIPPED | OUTPATIENT
Start: 2025-07-04

## 2025-07-04 RX ADMIN — ACETAMINOPHEN 650 MG: 325 TABLET ORAL at 08:13

## 2025-07-04 RX ADMIN — POLYETHYLENE GLYCOL 3350 17 G: 17 POWDER, FOR SOLUTION ORAL at 08:41

## 2025-07-04 RX ADMIN — INSULIN LISPRO 6 UNITS: 100 INJECTION, SOLUTION INTRAVENOUS; SUBCUTANEOUS at 08:41

## 2025-07-04 RX ADMIN — PANTOPRAZOLE SODIUM 40 MG: 40 TABLET, DELAYED RELEASE ORAL at 05:30

## 2025-07-04 RX ADMIN — INSULIN LISPRO 2 UNITS: 100 INJECTION, SOLUTION INTRAVENOUS; SUBCUTANEOUS at 08:41

## 2025-07-04 RX ADMIN — INSULIN GLARGINE 24 UNITS: 100 INJECTION, SOLUTION SUBCUTANEOUS at 08:14

## 2025-07-04 NOTE — CONSULTS
Session ID: [id number]  Session Duration: 7 minutes  Language: Icelandic   ID: #[id number]   Name: Abdulkadir

## 2025-07-04 NOTE — DISCHARGE INSTRUCTIONS
HOSPITALIST DISCHARGE INSTRUCTIONS  NAME: Yasmine Mcdonald   :  1970   MRN:  [id number]     Date/Time:  2025 10:34 AM    ADMIT DATE: 2025     DISCHARGE DATE: 2025     ADMITTING DIAGNOSIS:  Diabetic ketoacidosis  Elevated triglycerides    DISCHARGE DIAGNOSIS:  Same as above    Diabetic Ketoacidosis (DKA): Care Instructions  Overview  Diabetic ketoacidosis (DKA) happens when the body does not have enough insulin and can't use the sugar it needs for energy. When the body can't use sugar for energy, it starts to use fat for energy. This process makes fatty acids called ketones. The ketones build up in the blood and change the chemical balance in your body.  This problem can be very dangerous and needs to be treated. Without treatment, it can lead to a coma or death.  DKA occurs most often in people with type 1 diabetes. But people with type 2 diabetes also can get it. DKA can be caused by many things. It can happen if you don't take enough insulin. It can also happen if you have an infection or illness like the flu. Sometimes it happens if you are very dehydrated.  DKA can only be treated in a hospital with insulin and fluids. These are often given in a vein (I.V.).  Follow-up care is a key part of your treatment and safety. Be sure to make and go to all appointments, and call your doctor if you are having problems. It's also a good idea to know your test results and keep a list of the medicines you take.  How can you care for yourself at home?  To reduce your chance of ketoacidosis:  Take your insulin and other diabetes medicines on time and in the right dose.  If an infection caused your DKA and your doctor prescribed antibiotics, take them as directed. Do not stop taking them just because you feel better. You need to take the full course of antibiotics.  Test your blood sugar before meals and at bedtime or as often as your doctor advises. This is the best way to know when your blood sugar is high so you  the week. Before you start to be more active, check with your doctor to be sure it's safe. Try to do moderate activity at least 2½ hours a week. Or try to do vigorous activity at least 1¼ hours a week.  If you have diabetes, keep your blood sugar in your target range.  If you smoke, vape, or use other tobacco or nicotine products, try to quit. If you need help quitting, talk to your doctor about quit programs and medicines.  Take your medicines exactly as prescribed. Call your doctor if you think you are having a problem with your medicine.  Where can you learn more?  Go to https://www.[employer].net/patientEd and enter T123 to learn more about \"Learning About High Triglycerides.\"  Current as of: July 31, 2024  Content Version: 14.5  © 1142-4413 [employer].   Care instructions adapted under license by [employer]. If you have questions about a medical condition or this instruction, always ask your healthcare professional. [employer], disclaims any warranty or liability for your use of this information.       Aprenda sobre los triglicéridos altos  Learning About High Triglycerides  ¿Qué son los triglicéridos altos?     Los triglicéridos son un tipo de grasa en la shana. El organismo los usa para obtener energía. Usted necesita loli cierta cantidad para tener buena chris. Pamela los niveles altos de triglicéridos están vinculados con un riesgo elevado de arteriopatía coronaria. Un nivel alto puede ser loli señal de síndrome metabólico. Los niveles muy altos aumentan el riesgo de pancreatitis.  ¿Cuál es la causa?  Los triglicéridos altos pueden ser hereditarios. También pueden deberse a otras afecciones, mya la obesidad y la diabetes. Usted puede tener niveles altos de esta grasa si ingiere demasiados alimentos o bebidas con azúcar agregada o si noam mucho alcohol. Y algunos medicamentos pueden causar esta afección.  ¿Cuáles son los síntomas?  Los triglicéridos altos no suelen causar síntomas.

## 2025-07-04 NOTE — PLAN OF CARE
Problem: Chronic Conditions and Co-morbidities  Goal: Patient's chronic conditions and co-morbidity symptoms are monitored and maintained or improved  7/4/2025 0711 by Cary Anguiano RN  Outcome: Progressing  7/4/2025 0405 by Kandy Lomeli RN  Outcome: Progressing     Problem: Discharge Planning  Goal: Discharge to home or other facility with appropriate resources  Outcome: Progressing     Problem: Pain  Goal: Verbalizes/displays adequate comfort level or baseline comfort level  7/4/2025 0711 by Cary Anguiano RN  Outcome: Progressing  7/4/2025 0405 by Kandy Lomeli RN  Outcome: Progressing     Problem: Respiratory - Adult  Goal: Achieves optimal ventilation and oxygenation  7/4/2025 0711 by Cary Anguiano RN  Outcome: Progressing  7/4/2025 0405 by Kandy Lomeli RN  Outcome: Progressing     Problem: Safety - Adult  Goal: Free from fall injury  7/4/2025 0711 by Cary Anguiano RN  Outcome: Progressing  7/4/2025 0405 by Kandy Lomeli RN  Outcome: Progressing

## 2025-07-04 NOTE — PLAN OF CARE
Problem: Chronic Conditions and Co-morbidities  Goal: Patient's chronic conditions and co-morbidity symptoms are monitored and maintained or improved  7/4/2025 1224 by Cary Anguiano RN  Outcome: Adequate for Discharge  7/4/2025 0711 by Cary Anguiano RN  Outcome: Progressing  7/4/2025 0405 by Kandy Lomeli RN  Outcome: Progressing     Problem: Discharge Planning  Goal: Discharge to home or other facility with appropriate resources  7/4/2025 1224 by Cary Anguiano RN  Outcome: Adequate for Discharge  7/4/2025 0711 by Cary Anguiano RN  Outcome: Progressing     Problem: Pain  Goal: Verbalizes/displays adequate comfort level or baseline comfort level  7/4/2025 1224 by Cary Anguiano RN  Outcome: Adequate for Discharge  7/4/2025 0711 by Cary Anguiano RN  Outcome: Progressing  7/4/2025 0405 by Kandy Lomeli RN  Outcome: Progressing     Problem: Respiratory - Adult  Goal: Achieves optimal ventilation and oxygenation  7/4/2025 1224 by Cary Anguiano RN  Outcome: Adequate for Discharge  7/4/2025 0711 by Cary Anguiano RN  Outcome: Progressing  7/4/2025 0405 by Kandy Lomeli RN  Outcome: Progressing     Problem: Safety - Adult  Goal: Free from fall injury  7/4/2025 1224 by Cary Anguiano RN  Outcome: Adequate for Discharge  7/4/2025 0711 by Cary Anguiano RN  Outcome: Progressing  7/4/2025 0405 by Kandy Lomeli RN  Outcome: Progressing

## 2025-07-04 NOTE — PROGRESS NOTES
0700 Bedside and Verbal shift change report given to LOUIS Townsend (oncoming nurse) by LOUIS Gaitan (offgoing nurse). Report included the following information Nurse Handoff Report, Recent Results, Med Rec Status, and Cardiac Rhythm NSR.       1215   Discharge paperwork explained to patient, questions gone over & answered by this writer. IV removed. Patient agreeable to discharge.

## 2025-07-04 NOTE — CONSULTS
Session ID: [id number]  Session Duration: 3 minutes  Language: Georgian   ID: #[id number]   Name: Florencio

## 2025-07-04 NOTE — DISCHARGE SUMMARY
[facility]  [address] [place] [address] [phone]          Hospitalist Discharge Summary     Patient ID:  Yasmine Mcdonald  [id number]  55 y.o.  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 *         Hospital Course:     DKA POA  Uncontrolled diabetes POA  A1c greater than 14 on this admission  Transitioned off insulin drip  onto Lantus while in hospital as Insulin NPH Isophane & Regular (HUMULIN;NOVOLIN) (70-30)  nonformulary here; Insulin NPH Isophane & Regular (HUMULIN;NOVOLIN) (70-30) 15 units twice daily on discharge per diabetes manager recommendation  Defer to PCP whether to restart Mounjaro outpatient  Diabetes manager evaluated; follow-up outpatient for further adjustments     History of hyperlipidemia  Severely elevated triglycerides POA  Unable to calculate LDL on lipid panel due to severely elevated triglycerides  Restart Lipitor on discharge -appears to not have been taking prior  Fenofibrate started  Provided diet education on elevated triglycerides  Follow-up PCP for further management    Acute abdominal pain  Significantly improved; possibly due to constipation  CT abdomen pelvis no acute concerns  Lipase within normal limits  UA with no overt infection  PPI  Bowel regimen on discharge  Follow-up PCP

## (undated) DEVICE — CANNULA SEAL

## (undated) DEVICE — ELECTRO LUBE IS A SINGLE PATIENT USE DEVICE THAT IS INTENDED TO BE USED ON ELECTROSURGICAL ELECTRODES TO REDUCE STICKING.: Brand: KEY SURGICAL ELECTRO LUBE

## (undated) DEVICE — TUBING INSUF HEAT STRL 10 FT --

## (undated) DEVICE — Device

## (undated) DEVICE — 1200 GUARD II KIT W/5MM TUBE W/O VAC TUBE: Brand: GUARDIAN

## (undated) DEVICE — VCARE LARGE, UTERINE MANIPULATOR, VAGINAL-CERVICAL-AHLUWALIA'S-RETRACTOR-ELEVATOR: Brand: VCARE

## (undated) DEVICE — VCARE MEDIUM, UTERINE MANIPULATOR, VAGINAL-CERVICAL-AHLUWALIA'S-RETRACTOR-ELEVATOR: Brand: VCARE

## (undated) DEVICE — CORD BPLR L12FT DISP

## (undated) DEVICE — REM POLYHESIVE ADULT PATIENT RETURN ELECTRODE: Brand: VALLEYLAB

## (undated) DEVICE — TROCAR SITE CLOSURE DEVICE: Brand: ENDO CLOSE

## (undated) DEVICE — TRAY PREP DRY W/ PREM GLV 2 APPL 6 SPNG 2 UNDPD 1 OVERWRAP

## (undated) DEVICE — FENESTRATED BIPOLAR FORCEPS: Brand: ENDOWRIST

## (undated) DEVICE — NEEDLE INSUF L120MM DIA2MM DISP FOR PNEUMOPERI ENDOPATH

## (undated) DEVICE — BLADELESS OPTICAL TROCAR WITH FIXATION CANNULA: Brand: VERSAPORT

## (undated) DEVICE — STERILE POLYISOPRENE POWDER-FREE SURGICAL GLOVES WITH EMOLLIENT COATING: Brand: PROTEXIS

## (undated) DEVICE — SUTURE MCRYL SZ 4-0 L27IN ABSRB UD L19MM PS-2 1/2 CIR PRIM Y426H

## (undated) DEVICE — TIP COVER ACCESSORY

## (undated) DEVICE — VCARE SMALL, UTERINE MANIPULATOR, VAGINAL-CERVICAL-AHLUWALIA'S-RETRACTOR-ELEVATOR: Brand: VCARE

## (undated) DEVICE — NEEDLE HYPO 22GA L1.5IN BLK S STL HUB POLYPR SHLD REG BVL

## (undated) DEVICE — OBTRTR BLDELSS 8MM DISP -- DA VINCI - SNGL USE

## (undated) DEVICE — (D)SYR 10ML 1/5ML GRAD NSAF -- PKGING CHANGE USE ITEM 338027

## (undated) DEVICE — SUTURE STRATAFIX SPRL PDS + SZ 2-0 L6IN ABSRB VLT L36MM SXPP1B409

## (undated) DEVICE — SOLUTION IV 1000ML 0.9% SOD CHL

## (undated) DEVICE — AGENT HEMSTAT 3GM PURIFIED PLNT STARCH PWD ABSRB ARISTA AH

## (undated) DEVICE — UNIVERSAL FIXATION CANNULA: Brand: VERSAPORT

## (undated) DEVICE — LARGE NEEDLE DRIVER: Brand: ENDOWRIST

## (undated) DEVICE — DRAPE,REIN 53X77,STERILE: Brand: MEDLINE

## (undated) DEVICE — BAG SPEC REM 224ML W4XL6IN DIA10MM 1 HND GYN DISP ENDOPCH

## (undated) DEVICE — VISUALIZATION SYSTEM: Brand: CLEARIFY

## (undated) DEVICE — DEVON™ KNEE AND BODY STRAP 60" X 3" (1.5 M X 7.6 CM): Brand: DEVON

## (undated) DEVICE — FILTER SMK EVAC FLO CLR MEGADYNE

## (undated) DEVICE — SUTURE SZ 0 27IN 5/8 CIR UR-6  TAPER PT VIOLET ABSRB VICRYL J603H

## (undated) DEVICE — BLADELESS OPTICAL TROCAR WITH FIXATION CANNULA: Brand: VERSAONE

## (undated) DEVICE — SCISSORS SURG DIA8MM MPLR CRV ENDOWRIST

## (undated) DEVICE — SYR SEALANT TISSEAL FIBRIN

## (undated) DEVICE — APPLICATOR SURG XL L38CM FOR ARISTA ABSRB HEMSTAT FLEXITIP

## (undated) DEVICE — PAD SANIT NPKN 4IN GRD

## (undated) DEVICE — SEAL CANN 5MM S/SI DISP -- DA VINCI

## (undated) DEVICE — CATH FOL TY IC BAG 16FR 2000ML -- CONVERT TO ITEM 363158

## (undated) DEVICE — SURGICAL PROCEDURE PACK GYN LAPAROSCOPY CUST SMH LF

## (undated) DEVICE — DERMABOND SKIN ADH 0.7ML -- DERMABOND ADVANCED 12/BX

## (undated) DEVICE — APPLICATOR FLEXIBLE 360D 40CM --

## (undated) DEVICE — INFECTION CONTROL KIT SYS

## (undated) DEVICE — BASIC PACK: Brand: CONVERTORS

## (undated) DEVICE — BARRIER TISS ADH ABSRB 3X4IN -- GYNECARE INTERCEED

## (undated) DEVICE — (D)PREP SKN CHLRAPRP APPL 26ML -- CONVERT TO ITEM 371833